# Patient Record
Sex: FEMALE | Race: WHITE | NOT HISPANIC OR LATINO | Employment: OTHER | URBAN - METROPOLITAN AREA
[De-identification: names, ages, dates, MRNs, and addresses within clinical notes are randomized per-mention and may not be internally consistent; named-entity substitution may affect disease eponyms.]

---

## 2018-12-27 ENCOUNTER — APPOINTMENT (OUTPATIENT)
Dept: RADIOLOGY | Facility: MEDICAL CENTER | Age: 48
DRG: 982 | End: 2018-12-27
Attending: EMERGENCY MEDICINE

## 2018-12-27 ENCOUNTER — APPOINTMENT (OUTPATIENT)
Dept: RADIOLOGY | Facility: MEDICAL CENTER | Age: 48
DRG: 982 | End: 2018-12-27
Attending: SURGERY

## 2018-12-27 ENCOUNTER — HOSPITAL ENCOUNTER (INPATIENT)
Facility: MEDICAL CENTER | Age: 48
LOS: 6 days | DRG: 982 | End: 2019-01-02
Attending: EMERGENCY MEDICINE | Admitting: SURGERY

## 2018-12-27 DIAGNOSIS — S36.439A: ICD-10-CM

## 2018-12-27 DIAGNOSIS — G89.18 ACUTE POSTOPERATIVE PAIN: ICD-10-CM

## 2018-12-27 DIAGNOSIS — S22.41XA CLOSED FRACTURE OF FIVE RIBS OF RIGHT SIDE, INITIAL ENCOUNTER: ICD-10-CM

## 2018-12-27 DIAGNOSIS — S12.691A OTHER CLOSED NONDISPLACED FRACTURE OF SEVENTH CERVICAL VERTEBRA, INITIAL ENCOUNTER (HCC): ICD-10-CM

## 2018-12-27 PROBLEM — T14.90XA TRAUMA: Status: ACTIVE | Noted: 2018-12-27

## 2018-12-27 PROBLEM — S01.01XA SCALP LACERATION: Status: ACTIVE | Noted: 2018-12-27

## 2018-12-27 PROBLEM — S22.42XA FRACTURE OF MULTIPLE RIBS OF LEFT SIDE: Status: ACTIVE | Noted: 2018-12-27

## 2018-12-27 PROBLEM — F10.929 ACUTE ALCOHOL INTOXICATION (HCC): Status: ACTIVE | Noted: 2018-12-27

## 2018-12-27 PROBLEM — S12.600A CLOSED FRACTURE OF SEVENTH CERVICAL VERTEBRA (HCC): Status: ACTIVE | Noted: 2018-12-27

## 2018-12-27 PROBLEM — S39.91XA BLUNT ABDOMINAL TRAUMA: Status: ACTIVE | Noted: 2018-12-27

## 2018-12-27 LAB
ABO GROUP BLD: NORMAL
ABO GROUP BLD: NORMAL
ALBUMIN SERPL BCP-MCNC: 4 G/DL (ref 3.2–4.9)
ALBUMIN/GLOB SERPL: 1.3 G/DL
ALP SERPL-CCNC: 70 U/L (ref 30–99)
ALT SERPL-CCNC: 32 U/L (ref 2–50)
ANION GAP SERPL CALC-SCNC: 12 MMOL/L (ref 0–11.9)
APTT PPP: 23.7 SEC (ref 24.7–36)
AST SERPL-CCNC: 47 U/L (ref 12–45)
BASE EXCESS BLDA CALC-SCNC: -9 MMOL/L (ref -4–3)
BILIRUB SERPL-MCNC: 0.6 MG/DL (ref 0.1–1.5)
BLD GP AB SCN SERPL QL: NORMAL
BODY TEMPERATURE: 36.4 CENTIGRADE
BUN SERPL-MCNC: 17 MG/DL (ref 8–22)
CALCIUM SERPL-MCNC: 8.8 MG/DL (ref 8.5–10.5)
CFT BLD TEG: 4.6 MIN (ref 5–10)
CHLORIDE SERPL-SCNC: 109 MMOL/L (ref 96–112)
CLOT ANGLE BLD TEG: 68.3 DEGREES (ref 53–72)
CLOT LYSIS 30M P MA LENFR BLD TEG: 0 % (ref 0–8)
CO2 SERPL-SCNC: 19 MMOL/L (ref 20–33)
CREAT SERPL-MCNC: 0.9 MG/DL (ref 0.5–1.4)
CT.EXTRINSIC BLD ROTEM: 1.5 MIN (ref 1–3)
ERYTHROCYTE [DISTWIDTH] IN BLOOD BY AUTOMATED COUNT: 45.9 FL (ref 35.9–50)
ETHANOL BLD-MCNC: 0.15 G/DL
GLOBULIN SER CALC-MCNC: 3 G/DL (ref 1.9–3.5)
GLUCOSE SERPL-MCNC: 121 MG/DL (ref 65–99)
HCG SERPL QL: NEGATIVE
HCO3 BLDA-SCNC: 13 MMOL/L (ref 17–25)
HCT VFR BLD AUTO: 42.2 % (ref 37–47)
HGB BLD-MCNC: 13.7 G/DL (ref 12–16)
INR PPP: 1.09 (ref 0.87–1.13)
LACTATE BLD-SCNC: 3.2 MMOL/L (ref 0.5–2)
MCF BLD TEG: 58.1 MM (ref 50–70)
MCH RBC QN AUTO: 31.9 PG (ref 27–33)
MCHC RBC AUTO-ENTMCNC: 32.5 G/DL (ref 33.6–35)
MCV RBC AUTO: 98.1 FL (ref 81.4–97.8)
PA AA BLD-ACNC: 68.5 %
PA ADP BLD-ACNC: 90.9 %
PCO2 BLDA: 22.1 MMHG (ref 26–37)
PCO2 TEMP ADJ BLDA: 21.5 MMHG (ref 26–37)
PH BLDA: 7.4 [PH] (ref 7.4–7.5)
PH TEMP ADJ BLDA: 7.41 [PH] (ref 7.4–7.5)
PLATELET # BLD AUTO: 298 K/UL (ref 164–446)
PMV BLD AUTO: 9.6 FL (ref 9–12.9)
PO2 BLDA: 92 MMHG (ref 64–87)
PO2 TEMP ADJ BLDA: 88.6 MMHG (ref 64–87)
POTASSIUM SERPL-SCNC: 3.6 MMOL/L (ref 3.6–5.5)
PROT SERPL-MCNC: 7 G/DL (ref 6–8.2)
PROTHROMBIN TIME: 14.2 SEC (ref 12–14.6)
RBC # BLD AUTO: 4.3 M/UL (ref 4.2–5.4)
RH BLD: NORMAL
RH BLD: NORMAL
SAO2 % BLDA: 96.5 % (ref 93–99)
SODIUM SERPL-SCNC: 140 MMOL/L (ref 135–145)
TEG ALGORITHM TGALG: ABNORMAL
WBC # BLD AUTO: 9.7 K/UL (ref 4.8–10.8)

## 2018-12-27 PROCEDURE — 700111 HCHG RX REV CODE 636 W/ 250 OVERRIDE (IP): Performed by: EMERGENCY MEDICINE

## 2018-12-27 PROCEDURE — 80307 DRUG TEST PRSMV CHEM ANLYZR: CPT

## 2018-12-27 PROCEDURE — 85730 THROMBOPLASTIN TIME PARTIAL: CPT

## 2018-12-27 PROCEDURE — 70486 CT MAXILLOFACIAL W/O DYE: CPT

## 2018-12-27 PROCEDURE — 88307 TISSUE EXAM BY PATHOLOGIST: CPT

## 2018-12-27 PROCEDURE — 80053 COMPREHEN METABOLIC PANEL: CPT

## 2018-12-27 PROCEDURE — 700111 HCHG RX REV CODE 636 W/ 250 OVERRIDE (IP)

## 2018-12-27 PROCEDURE — 72125 CT NECK SPINE W/O DYE: CPT

## 2018-12-27 PROCEDURE — 70450 CT HEAD/BRAIN W/O DYE: CPT

## 2018-12-27 PROCEDURE — 71260 CT THORAX DX C+: CPT

## 2018-12-27 PROCEDURE — 700117 HCHG RX CONTRAST REV CODE 255: Performed by: SURGERY

## 2018-12-27 PROCEDURE — 501838 HCHG SUTURE GENERAL: Performed by: SURGERY

## 2018-12-27 PROCEDURE — 72170 X-RAY EXAM OF PELVIS: CPT

## 2018-12-27 PROCEDURE — 160036 HCHG PACU - EA ADDL 30 MINS PHASE I: Performed by: SURGERY

## 2018-12-27 PROCEDURE — 83605 ASSAY OF LACTIC ACID: CPT

## 2018-12-27 PROCEDURE — 96374 THER/PROPH/DIAG INJ IV PUSH: CPT

## 2018-12-27 PROCEDURE — 502704 HCHG DEVICE, LIGASURE IMPACT: Performed by: SURGERY

## 2018-12-27 PROCEDURE — 501445 HCHG STAPLER, SKIN DISP: Performed by: SURGERY

## 2018-12-27 PROCEDURE — 160041 HCHG SURGERY MINUTES - EA ADDL 1 MIN LEVEL 4: Performed by: SURGERY

## 2018-12-27 PROCEDURE — 84484 ASSAY OF TROPONIN QUANT: CPT

## 2018-12-27 PROCEDURE — 770022 HCHG ROOM/CARE - ICU (200)

## 2018-12-27 PROCEDURE — 90715 TDAP VACCINE 7 YRS/> IM: CPT | Performed by: EMERGENCY MEDICINE

## 2018-12-27 PROCEDURE — 501452 HCHG STAPLES, GIA MULTIFIRE 60/80: Performed by: SURGERY

## 2018-12-27 PROCEDURE — 86850 RBC ANTIBODY SCREEN: CPT

## 2018-12-27 PROCEDURE — 86900 BLOOD TYPING SEROLOGIC ABO: CPT

## 2018-12-27 PROCEDURE — 99291 CRITICAL CARE FIRST HOUR: CPT

## 2018-12-27 PROCEDURE — 3E0234Z INTRODUCTION OF SERUM, TOXOID AND VACCINE INTO MUSCLE, PERCUTANEOUS APPROACH: ICD-10-PCS | Performed by: SURGERY

## 2018-12-27 PROCEDURE — 160009 HCHG ANES TIME/MIN: Performed by: SURGERY

## 2018-12-27 PROCEDURE — G0390 TRAUMA RESPONS W/HOSP CRITI: HCPCS

## 2018-12-27 PROCEDURE — 71045 X-RAY EXAM CHEST 1 VIEW: CPT

## 2018-12-27 PROCEDURE — 85347 COAGULATION TIME ACTIVATED: CPT

## 2018-12-27 PROCEDURE — 700101 HCHG RX REV CODE 250

## 2018-12-27 PROCEDURE — 160048 HCHG OR STATISTICAL LEVEL 1-5: Performed by: SURGERY

## 2018-12-27 PROCEDURE — 85610 PROTHROMBIN TIME: CPT

## 2018-12-27 PROCEDURE — 160002 HCHG RECOVERY MINUTES (STAT): Performed by: SURGERY

## 2018-12-27 PROCEDURE — 86901 BLOOD TYPING SEROLOGIC RH(D): CPT

## 2018-12-27 PROCEDURE — 84703 CHORIONIC GONADOTROPIN ASSAY: CPT

## 2018-12-27 PROCEDURE — 72131 CT LUMBAR SPINE W/O DYE: CPT

## 2018-12-27 PROCEDURE — 72128 CT CHEST SPINE W/O DYE: CPT

## 2018-12-27 PROCEDURE — 160035 HCHG PACU - 1ST 60 MINS PHASE I: Performed by: SURGERY

## 2018-12-27 PROCEDURE — 160029 HCHG SURGERY MINUTES - 1ST 30 MINS LEVEL 4: Performed by: SURGERY

## 2018-12-27 PROCEDURE — 85384 FIBRINOGEN ACTIVITY: CPT

## 2018-12-27 PROCEDURE — 82803 BLOOD GASES ANY COMBINATION: CPT

## 2018-12-27 PROCEDURE — 85576 BLOOD PLATELET AGGREGATION: CPT

## 2018-12-27 PROCEDURE — 160022 HCHG BLOCK: Performed by: SURGERY

## 2018-12-27 PROCEDURE — 501435 HCHG STAPLER, LINEAR 60: Performed by: SURGERY

## 2018-12-27 PROCEDURE — 85027 COMPLETE CBC AUTOMATED: CPT

## 2018-12-27 RX ADMIN — FENTANYL CITRATE 50 MCG: 50 INJECTION, SOLUTION INTRAMUSCULAR; INTRAVENOUS at 22:35

## 2018-12-27 RX ADMIN — CLOSTRIDIUM TETANI TOXOID ANTIGEN (FORMALDEHYDE INACTIVATED), CORYNEBACTERIUM DIPHTHERIAE TOXOID ANTIGEN (FORMALDEHYDE INACTIVATED), BORDETELLA PERTUSSIS TOXOID ANTIGEN (GLUTARALDEHYDE INACTIVATED), BORDETELLA PERTUSSIS FILAMENTOUS HEMAGGLUTININ ANTIGEN (FORMALDEHYDE INACTIVATED), BORDETELLA PERTUSSIS PERTACTIN ANTIGEN, AND BORDETELLA PERTUSSIS FIMBRIAE 2/3 ANTIGEN 0.5 ML: 5; 2; 2.5; 5; 3; 5 INJECTION, SUSPENSION INTRAMUSCULAR at 23:01

## 2018-12-27 RX ADMIN — IOHEXOL 100 ML: 350 INJECTION, SOLUTION INTRAVENOUS at 23:06

## 2018-12-28 ENCOUNTER — APPOINTMENT (OUTPATIENT)
Dept: RADIOLOGY | Facility: MEDICAL CENTER | Age: 48
DRG: 982 | End: 2018-12-28
Attending: EMERGENCY MEDICINE

## 2018-12-28 ENCOUNTER — APPOINTMENT (OUTPATIENT)
Dept: RADIOLOGY | Facility: MEDICAL CENTER | Age: 48
DRG: 982 | End: 2018-12-28
Attending: NEUROLOGICAL SURGERY

## 2018-12-28 ENCOUNTER — APPOINTMENT (OUTPATIENT)
Dept: RADIOLOGY | Facility: MEDICAL CENTER | Age: 48
DRG: 982 | End: 2018-12-28
Attending: SURGERY

## 2018-12-28 ENCOUNTER — APPOINTMENT (OUTPATIENT)
Dept: CARDIOLOGY | Facility: MEDICAL CENTER | Age: 48
DRG: 982 | End: 2018-12-28
Attending: ANESTHESIOLOGY
Payer: OTHER MISCELLANEOUS

## 2018-12-28 PROBLEM — I49.3 VENTRICULAR ECTOPY: Status: ACTIVE | Noted: 2018-12-28

## 2018-12-28 PROBLEM — J98.2 PNEUMOMEDIASTINUM (HCC): Status: ACTIVE | Noted: 2018-12-28

## 2018-12-28 PROBLEM — S36.439A SMALL BOWEL LACERATION: Status: ACTIVE | Noted: 2018-12-27

## 2018-12-28 PROBLEM — S22.32XA CLOSED FRACTURE OF ONE RIB OF LEFT SIDE: Status: ACTIVE | Noted: 2018-12-28

## 2018-12-28 PROBLEM — S27.322A BILATERAL PULMONARY CONTUSION: Status: ACTIVE | Noted: 2018-12-28

## 2018-12-28 PROBLEM — Z78.9 NO CONTRAINDICATION TO DEEP VEIN THROMBOSIS (DVT) PROPHYLAXIS: Status: ACTIVE | Noted: 2018-12-28

## 2018-12-28 PROBLEM — S22.41XA: Status: ACTIVE | Noted: 2018-12-27

## 2018-12-28 PROBLEM — Z53.09 CONTRAINDICATION TO DEEP VEIN THROMBOSIS (DVT) PROPHYLAXIS: Status: ACTIVE | Noted: 2018-12-28

## 2018-12-28 LAB
ANION GAP SERPL CALC-SCNC: 10 MMOL/L (ref 0–11.9)
BASOPHILS # BLD AUTO: 0.2 % (ref 0–1.8)
BASOPHILS # BLD: 0.02 K/UL (ref 0–0.12)
BUN SERPL-MCNC: 14 MG/DL (ref 8–22)
CALCIUM SERPL-MCNC: 8.2 MG/DL (ref 8.5–10.5)
CHLORIDE SERPL-SCNC: 109 MMOL/L (ref 96–112)
CO2 SERPL-SCNC: 21 MMOL/L (ref 20–33)
CREAT SERPL-MCNC: 0.54 MG/DL (ref 0.5–1.4)
EOSINOPHIL # BLD AUTO: 0 K/UL (ref 0–0.51)
EOSINOPHIL NFR BLD: 0 % (ref 0–6.9)
ERYTHROCYTE [DISTWIDTH] IN BLOOD BY AUTOMATED COUNT: 44.5 FL (ref 35.9–50)
GLUCOSE SERPL-MCNC: 114 MG/DL (ref 65–99)
HCT VFR BLD AUTO: 37 % (ref 37–47)
HGB BLD-MCNC: 12.5 G/DL (ref 12–16)
IMM GRANULOCYTES # BLD AUTO: 0.03 K/UL (ref 0–0.11)
IMM GRANULOCYTES NFR BLD AUTO: 0.3 % (ref 0–0.9)
LACTATE BLD-SCNC: 1 MMOL/L (ref 0.5–2)
LYMPHOCYTES # BLD AUTO: 0.44 K/UL (ref 1–4.8)
LYMPHOCYTES NFR BLD: 4.5 % (ref 22–41)
MCH RBC QN AUTO: 32.2 PG (ref 27–33)
MCHC RBC AUTO-ENTMCNC: 33.8 G/DL (ref 33.6–35)
MCV RBC AUTO: 95.4 FL (ref 81.4–97.8)
MONOCYTES # BLD AUTO: 0.36 K/UL (ref 0–0.85)
MONOCYTES NFR BLD AUTO: 3.7 % (ref 0–13.4)
NEUTROPHILS # BLD AUTO: 8.92 K/UL (ref 2–7.15)
NEUTROPHILS NFR BLD: 91.3 % (ref 44–72)
NRBC # BLD AUTO: 0 K/UL
NRBC BLD-RTO: 0 /100 WBC
PATHOLOGY CONSULT NOTE: NORMAL
PLATELET # BLD AUTO: 220 K/UL (ref 164–446)
PMV BLD AUTO: 9.6 FL (ref 9–12.9)
POTASSIUM SERPL-SCNC: 3.6 MMOL/L (ref 3.6–5.5)
RBC # BLD AUTO: 3.88 M/UL (ref 4.2–5.4)
SODIUM SERPL-SCNC: 140 MMOL/L (ref 135–145)
TROPONIN I SERPL-MCNC: <0.01 NG/ML (ref 0–0.04)
TROPONIN I SERPL-MCNC: <0.01 NG/ML (ref 0–0.04)
WBC # BLD AUTO: 9.8 K/UL (ref 4.8–10.8)

## 2018-12-28 PROCEDURE — 700111 HCHG RX REV CODE 636 W/ 250 OVERRIDE (IP): Performed by: SURGERY

## 2018-12-28 PROCEDURE — 99233 SBSQ HOSP IP/OBS HIGH 50: CPT | Performed by: SURGERY

## 2018-12-28 PROCEDURE — 700111 HCHG RX REV CODE 636 W/ 250 OVERRIDE (IP): Performed by: ANESTHESIOLOGY

## 2018-12-28 PROCEDURE — 93325 DOPPLER ECHO COLOR FLOW MAPG: CPT

## 2018-12-28 PROCEDURE — 84484 ASSAY OF TROPONIN QUANT: CPT

## 2018-12-28 PROCEDURE — 700111 HCHG RX REV CODE 636 W/ 250 OVERRIDE (IP)

## 2018-12-28 PROCEDURE — 71045 X-RAY EXAM CHEST 1 VIEW: CPT

## 2018-12-28 PROCEDURE — 83605 ASSAY OF LACTIC ACID: CPT

## 2018-12-28 PROCEDURE — 85025 COMPLETE CBC W/AUTO DIFF WBC: CPT

## 2018-12-28 PROCEDURE — 76705 ECHO EXAM OF ABDOMEN: CPT

## 2018-12-28 PROCEDURE — 700111 HCHG RX REV CODE 636 W/ 250 OVERRIDE (IP): Performed by: NURSE PRACTITIONER

## 2018-12-28 PROCEDURE — 700105 HCHG RX REV CODE 258: Performed by: SURGERY

## 2018-12-28 PROCEDURE — A9270 NON-COVERED ITEM OR SERVICE: HCPCS

## 2018-12-28 PROCEDURE — 0DQL0ZZ REPAIR TRANSVERSE COLON, OPEN APPROACH: ICD-10-PCS | Performed by: SURGERY

## 2018-12-28 PROCEDURE — 770006 HCHG ROOM/CARE - MED/SURG/GYN SEMI*

## 2018-12-28 PROCEDURE — 80048 BASIC METABOLIC PNL TOTAL CA: CPT

## 2018-12-28 PROCEDURE — 0JQ10ZZ REPAIR FACE SUBCUTANEOUS TISSUE AND FASCIA, OPEN APPROACH: ICD-10-PCS | Performed by: PLASTIC SURGERY

## 2018-12-28 PROCEDURE — 0DBA0ZZ EXCISION OF JEJUNUM, OPEN APPROACH: ICD-10-PCS | Performed by: SURGERY

## 2018-12-28 PROCEDURE — 700102 HCHG RX REV CODE 250 W/ 637 OVERRIDE(OP)

## 2018-12-28 RX ORDER — SODIUM CHLORIDE, SODIUM LACTATE, POTASSIUM CHLORIDE, CALCIUM CHLORIDE 600; 310; 30; 20 MG/100ML; MG/100ML; MG/100ML; MG/100ML
INJECTION, SOLUTION INTRAVENOUS CONTINUOUS
Status: DISCONTINUED | OUTPATIENT
Start: 2018-12-28 | End: 2018-12-28 | Stop reason: HOSPADM

## 2018-12-28 RX ORDER — ONDANSETRON 2 MG/ML
4 INJECTION INTRAMUSCULAR; INTRAVENOUS EVERY 4 HOURS PRN
Status: DISCONTINUED | OUTPATIENT
Start: 2018-12-28 | End: 2019-01-02 | Stop reason: HOSPADM

## 2018-12-28 RX ORDER — MEPERIDINE HYDROCHLORIDE 25 MG/ML
12.5 INJECTION INTRAMUSCULAR; INTRAVENOUS; SUBCUTANEOUS
Status: DISCONTINUED | OUTPATIENT
Start: 2018-12-28 | End: 2018-12-28 | Stop reason: HOSPADM

## 2018-12-28 RX ORDER — BACITRACIN ZINC 500 [USP'U]/G
OINTMENT TOPICAL
Status: DISCONTINUED | OUTPATIENT
Start: 2018-12-28 | End: 2018-12-28 | Stop reason: HOSPADM

## 2018-12-28 RX ORDER — HYDROMORPHONE HYDROCHLORIDE 1 MG/ML
0.2 INJECTION, SOLUTION INTRAMUSCULAR; INTRAVENOUS; SUBCUTANEOUS
Status: DISCONTINUED | OUTPATIENT
Start: 2018-12-28 | End: 2018-12-28 | Stop reason: HOSPADM

## 2018-12-28 RX ORDER — SODIUM CHLORIDE, SODIUM LACTATE, POTASSIUM CHLORIDE, CALCIUM CHLORIDE 600; 310; 30; 20 MG/100ML; MG/100ML; MG/100ML; MG/100ML
INJECTION, SOLUTION INTRAVENOUS CONTINUOUS
Status: DISCONTINUED | OUTPATIENT
Start: 2018-12-28 | End: 2018-12-30

## 2018-12-28 RX ORDER — LIDOCAINE HYDROCHLORIDE AND EPINEPHRINE BITARTRATE 20; .01 MG/ML; MG/ML
INJECTION, SOLUTION SUBCUTANEOUS
Status: DISCONTINUED | OUTPATIENT
Start: 2018-12-28 | End: 2018-12-28 | Stop reason: HOSPADM

## 2018-12-28 RX ORDER — HALOPERIDOL 5 MG/ML
1 INJECTION INTRAMUSCULAR
Status: DISCONTINUED | OUTPATIENT
Start: 2018-12-28 | End: 2018-12-28 | Stop reason: HOSPADM

## 2018-12-28 RX ORDER — HYDROMORPHONE HYDROCHLORIDE 2 MG/ML
.5-1 INJECTION, SOLUTION INTRAMUSCULAR; INTRAVENOUS; SUBCUTANEOUS
Status: DISCONTINUED | OUTPATIENT
Start: 2018-12-28 | End: 2018-12-28

## 2018-12-28 RX ORDER — DIPHENHYDRAMINE HYDROCHLORIDE 50 MG/ML
12.5 INJECTION INTRAMUSCULAR; INTRAVENOUS
Status: DISCONTINUED | OUTPATIENT
Start: 2018-12-28 | End: 2018-12-28 | Stop reason: HOSPADM

## 2018-12-28 RX ORDER — HYDROMORPHONE HYDROCHLORIDE 1 MG/ML
0.4 INJECTION, SOLUTION INTRAMUSCULAR; INTRAVENOUS; SUBCUTANEOUS
Status: DISCONTINUED | OUTPATIENT
Start: 2018-12-28 | End: 2018-12-28 | Stop reason: HOSPADM

## 2018-12-28 RX ORDER — MAGNESIUM SULFATE HEPTAHYDRATE 40 MG/ML
2 INJECTION, SOLUTION INTRAVENOUS ONCE
Status: COMPLETED | OUTPATIENT
Start: 2018-12-28 | End: 2018-12-28

## 2018-12-28 RX ORDER — HYDROMORPHONE HYDROCHLORIDE 1 MG/ML
0.1 INJECTION, SOLUTION INTRAMUSCULAR; INTRAVENOUS; SUBCUTANEOUS
Status: DISCONTINUED | OUTPATIENT
Start: 2018-12-28 | End: 2018-12-28 | Stop reason: HOSPADM

## 2018-12-28 RX ORDER — HYDROMORPHONE HYDROCHLORIDE 1 MG/ML
.5-1 INJECTION, SOLUTION INTRAMUSCULAR; INTRAVENOUS; SUBCUTANEOUS
Status: DISCONTINUED | OUTPATIENT
Start: 2018-12-28 | End: 2018-12-28

## 2018-12-28 RX ORDER — ONDANSETRON 2 MG/ML
4 INJECTION INTRAMUSCULAR; INTRAVENOUS
Status: DISCONTINUED | OUTPATIENT
Start: 2018-12-28 | End: 2018-12-28 | Stop reason: HOSPADM

## 2018-12-28 RX ADMIN — SODIUM CHLORIDE, POTASSIUM CHLORIDE, SODIUM LACTATE AND CALCIUM CHLORIDE: 600; 310; 30; 20 INJECTION, SOLUTION INTRAVENOUS at 01:15

## 2018-12-28 RX ADMIN — HYDROMORPHONE HYDROCHLORIDE 0.4 MG: 1 INJECTION, SOLUTION INTRAMUSCULAR; INTRAVENOUS; SUBCUTANEOUS at 02:54

## 2018-12-28 RX ADMIN — SODIUM CHLORIDE, POTASSIUM CHLORIDE, SODIUM LACTATE AND CALCIUM CHLORIDE: 600; 310; 30; 20 INJECTION, SOLUTION INTRAVENOUS at 10:03

## 2018-12-28 RX ADMIN — HYDROMORPHONE HYDROCHLORIDE 1 MG: 2 INJECTION INTRAMUSCULAR; INTRAVENOUS; SUBCUTANEOUS at 04:23

## 2018-12-28 RX ADMIN — Medication: at 21:38

## 2018-12-28 RX ADMIN — Medication: at 10:08

## 2018-12-28 RX ADMIN — FENTANYL CITRATE 50 MCG: 50 INJECTION, SOLUTION INTRAMUSCULAR; INTRAVENOUS at 02:12

## 2018-12-28 RX ADMIN — FAMOTIDINE 20 MG: 10 INJECTION INTRAVENOUS at 10:17

## 2018-12-28 RX ADMIN — MAGNESIUM SULFATE 2 G: 2 INJECTION INTRAVENOUS at 01:47

## 2018-12-28 RX ADMIN — ENOXAPARIN SODIUM 30 MG: 100 INJECTION SUBCUTANEOUS at 16:53

## 2018-12-28 RX ADMIN — FAMOTIDINE 20 MG: 10 INJECTION INTRAVENOUS at 16:52

## 2018-12-28 RX ADMIN — HYDROMORPHONE HYDROCHLORIDE 0.4 MG: 1 INJECTION, SOLUTION INTRAMUSCULAR; INTRAVENOUS; SUBCUTANEOUS at 02:47

## 2018-12-28 RX ADMIN — SODIUM CHLORIDE, POTASSIUM CHLORIDE, SODIUM LACTATE AND CALCIUM CHLORIDE: 600; 310; 30; 20 INJECTION, SOLUTION INTRAVENOUS at 17:47

## 2018-12-28 RX ADMIN — HYDROMORPHONE HYDROCHLORIDE 0.2 MG: 1 INJECTION, SOLUTION INTRAMUSCULAR; INTRAVENOUS; SUBCUTANEOUS at 03:02

## 2018-12-28 RX ADMIN — FENTANYL CITRATE 50 MCG: 50 INJECTION, SOLUTION INTRAMUSCULAR; INTRAVENOUS at 01:57

## 2018-12-28 RX ADMIN — HYDROMORPHONE HYDROCHLORIDE 1 MG: 2 INJECTION INTRAMUSCULAR; INTRAVENOUS; SUBCUTANEOUS at 07:36

## 2018-12-28 ASSESSMENT — PATIENT HEALTH QUESTIONNAIRE - PHQ9
2. FEELING DOWN, DEPRESSED, IRRITABLE, OR HOPELESS: NOT AT ALL
SUM OF ALL RESPONSES TO PHQ9 QUESTIONS 1 AND 2: 0
1. LITTLE INTEREST OR PLEASURE IN DOING THINGS: NOT AT ALL

## 2018-12-28 ASSESSMENT — PAIN SCALES - GENERAL
PAINLEVEL_OUTOF10: 0
PAINLEVEL_OUTOF10: 8
PAINLEVEL_OUTOF10: 8
PAINLEVEL_OUTOF10: 0
PAINLEVEL_OUTOF10: 7
PAINLEVEL_OUTOF10: 8
PAINLEVEL_OUTOF10: 6
PAINLEVEL_OUTOF10: 6
PAINLEVEL_OUTOF10: 4
PAINLEVEL_OUTOF10: 6
PAINLEVEL_OUTOF10: 7
PAINLEVEL_OUTOF10: 7

## 2018-12-28 ASSESSMENT — LIFESTYLE VARIABLES
EVER HAD A DRINK FIRST THING IN THE MORNING TO STEADY YOUR NERVES TO GET RID OF A HANGOVER: NO
CONSUMPTION TOTAL: POSITIVE
HAVE YOU EVER FELT YOU SHOULD CUT DOWN ON YOUR DRINKING: NO
EVER_SMOKED: NEVER
TOTAL SCORE: 0
ALCOHOL_USE: YES
EVER FELT BAD OR GUILTY ABOUT YOUR DRINKING: NO
EVER_SMOKED: YES
SUBSTANCE_ABUSE: 1
HAVE PEOPLE ANNOYED YOU BY CRITICIZING YOUR DRINKING: NO
TOTAL SCORE: 0
ON A TYPICAL DAY WHEN YOU DRINK ALCOHOL HOW MANY DRINKS DO YOU HAVE: 1
HOW MANY TIMES IN THE PAST YEAR HAVE YOU HAD 5 OR MORE DRINKS IN A DAY: 2
AVERAGE NUMBER OF DAYS PER WEEK YOU HAVE A DRINK CONTAINING ALCOHOL: 5
TOTAL SCORE: 0

## 2018-12-28 ASSESSMENT — COPD QUESTIONNAIRES
HAVE YOU SMOKED AT LEAST 100 CIGARETTES IN YOUR ENTIRE LIFE: NO/DON'T KNOW
DURING THE PAST 4 WEEKS HOW MUCH DID YOU FEEL SHORT OF BREATH: NONE/LITTLE OF THE TIME
COPD SCREENING SCORE: 0
DO YOU EVER COUGH UP ANY MUCUS OR PHLEGM?: NO/ONLY WITH OCCASIONAL COLDS OR INFECTIONS

## 2018-12-28 ASSESSMENT — COGNITIVE AND FUNCTIONAL STATUS - GENERAL
DRESSING REGULAR LOWER BODY CLOTHING: A LOT
MOBILITY SCORE: 17
HELP NEEDED FOR BATHING: A LOT
STANDING UP FROM CHAIR USING ARMS: A LOT
TURNING FROM BACK TO SIDE WHILE IN FLAT BAD: A LITTLE
MOVING TO AND FROM BED TO CHAIR: A LITTLE
CLIMB 3 TO 5 STEPS WITH RAILING: A LITTLE
MOVING FROM LYING ON BACK TO SITTING ON SIDE OF FLAT BED: A LITTLE
DAILY ACTIVITIY SCORE: 17
WALKING IN HOSPITAL ROOM: A LITTLE
PERSONAL GROOMING: A LITTLE
DRESSING REGULAR UPPER BODY CLOTHING: A LITTLE
SUGGESTED CMS G CODE MODIFIER MOBILITY: CK
SUGGESTED CMS G CODE MODIFIER DAILY ACTIVITY: CK
TOILETING: A LITTLE

## 2018-12-28 ASSESSMENT — ENCOUNTER SYMPTOMS
CHILLS: 0
BLURRED VISION: 0
ROS GI COMMENTS: BM PRIOR TO ARRIVAL
FOCAL WEAKNESS: 0
TREMORS: 0
SHORTNESS OF BREATH: 0
DOUBLE VISION: 0
HEADACHES: 0
NAUSEA: 0
FEVER: 0
TINGLING: 0
NECK PAIN: 1
VOMITING: 0
DIZZINESS: 0
BACK PAIN: 0
SPEECH CHANGE: 0
ABDOMINAL PAIN: 1
MYALGIAS: 1
SENSORY CHANGE: 0

## 2018-12-28 NOTE — OR NURSING
Pt having frequent PVCs, called Dr. Tomlinson and he concurred pt was having this in OR and he also said Dr. Juarez is aware.  He provided order for magnesium.

## 2018-12-28 NOTE — ED NOTES
Pt. Upgraded to a trauma Red per Dr. Cooper. Pulses decreased. Unable to obtain manual BP and/or Monitored BP.
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
## 2018-12-28 NOTE — PROGRESS NOTES
0315: Pt to S126 accompanied by PACU RN Deangelo. VSS:   HR: 85  BP: 121/73  SpO2: 100% (3.5L oxymask)  RR: 12-14  T: 97.5F (temporal)  Wt: 63.5 kg (bed scale)  Pain: Midline abdomen (5-6/10, sore)    SCD's applied. 2-RN skin check performed. Noted as follows:   -Repaired laceration (sutured) to forehead, nondraining.  -Abrasion to L clavicle.  -Midline abdominal surgical incision (dressing CDI).  -Small abrasion to R thumb and R middle finger (knuckle).  -Bruising to L flank, R upper thigh/knee (pt reports from a prior fall). L knee bruising (from prior fall as well per pt).

## 2018-12-28 NOTE — PROGRESS NOTES
Dictation line down  Full consult to follow once system is up  Complex forehead laceration  Dr. Juarez is taking the pt to the OR for an X lap  I will repair forehead lacs while the patient is asleep, no fractures on maxillofacial CT scan

## 2018-12-28 NOTE — OP REPORT
DATE OF SERVICE:  12/28/2018    PREOPERATIVE DIAGNOSIS:  Complex laceration of forehead.    POSTOPERATIVE DIAGNOSIS:  Complex laceration of forehead.    PROCEDURE PERFORMED:  Repair of a 12 cm complex laceration of the forehead on   to the glabellar area of the nose and back up onto the forehead.    SURGEON:  Kana Swenson MD    ASSISTANT:  None.    ANESTHESIOLOGIST:  Eliezer Tomlinson MD    OPERATIVE INDICATIONS:  Dr. Juarez called me.  He had a young female who he   was emergently taken to the operating room for laparotomy.  She had a complex   laceration of the forehead, they asked me to repair.  When I came to evaluate   the patient, she was intubated and was undergoing her exploratory laparotomy.    On examination, the patient had a complex irregularly shaped bevelled   laceration, which extended from the forehead on one side across the glabellar   area down on to root of the nose and back up to the forehead on the other   side.  There was some nonviable tissue in the wound that needs to be debrided   as well.    OPERATIVE DESCRIPTION:  The patient's nonviable tissue was sharply excised   using scissors.  Once we had clean edges, bleeding was treated with direct   pressure.  The frontalis muscle was reapproximated using interrupted 3-0   Vicryl suture.  Excess subcutaneous tissue was approximated with 5-0 Vicryl   suture.  The skin was approximated with a running 6-0 nylon suture.  Anatomic   approximation was determined to be good.  Antibiotic ointment was applied.    Patient was left in the operative room under the care of Dr. Juarez who was   continuing his exploratory laparotomy.       ____________________________________     KANA SWENSON MD    SWW / NTS    DD:  12/28/2018 00:24:08  DT:  12/28/2018 00:34:53    D#:  [id number]  Job#:  [id number]

## 2018-12-28 NOTE — OP REPORT
DATE OF SERVICE:  12/28/2018    PREOPERATIVE DIAGNOSIS:  Intra-abdominal free air.    POSTOPERATIVE DIAGNOSIS:  Intra-abdominal free air.    PROCEDURES PERFORMED:  1.  Exploratory laparotomy, suture repair of right colon injury.  2.  Small bowel resection with anastomosis.    PRIMARY SURGEON:  Ramesh Juarez MD    ASSISTANT:  Aydee Diggs NP    ANESTHESIA:  General endotracheal.    ESTIMATED BLOOD LOSS:  25 mL.    COMPLICATIONS:  None immediately apparent.    SPECIMENS:  Jejunum.    OPERATIVE FINDINGS:  Along the antimesenteric edge of the proximal transverse   colon, there was a 3.5 cm serosal tear near one of the taenia.  Approximately 20   cm distal to the ligament of Treitz, there is a jejunal injury involving   approximately 40% of the circumference of the bowel in this area and another   25 cm distal to that injury, there was an additional jejunal injury involving   approximately 25% of the circumference of the intestine.  The 2 injuries were   excised as a single specimen.  There was minimal, if any, contamination from the bowel perforations.    DESCRIPTION OF PROCEDURE:  The patient was taken back to operating room and   placed in the supine position.  General anesthesia was induced and the patient   was intubated.  Bilateral SCD devices were placed.  Appropriate IV   antibiotics were given.  All bony prominences were carefully protected.  The   abdomen was then widely prepped and draped in a sterile fashion.  A time-out   was performed.  The patient and procedure both verified.    A long vertical midline incision was made.  Subcutaneous tissues were divided   down to level of the fascia.  The fascia was then opened along the entire   length of the incision.  A Bookwalter was affixed to the table and used for   retraction.  Upon initial survey of the abdomen, there was blood throughout   the entire abdominal compartment, maybe 20 mL or so.  The omentum had some   blunt injury to it.  Hemostasis of it  was achieved with electrocautery where   needed.  I then evaluated the colon and the above injury was identified.  This   was repaired with interrupted imbricating 3-0 Vicryl sutures.  I then   identified the ligament of Treitz and identified the 2 injuries.  I resected   the above-mentioned segment using serial firings from a DAKOTA-75 mm stapler with   blue loads.  This was handed off as specimen.  The mesentery of this   intestine was divided using a LigaSure impact device.  I then performed a   side-to-side stapled anastomosis with a single firing from a DAKOTA-75 mm stapler   with a blue load.  The resultant defect then created was closed with a single   firing from a TA 60 stapler with a blue load.  The anastomosis was palpated   and was noted to be widely patent.  The staple lines appeared to be healthy   and hemostatic.  I then ran the bowel from the ligament of Treitz down to the   terminal ileum.  There was no further injury identified.  I then ran the   intraabdominal portions of the colon.  I did identify small mesenteric   hematoma associated with the mid portion of the mesentery of the transverse   colon.  However, the colon in this area appeared to be viable.  The remaining   portions of the colon appeared to be free from injury as well.  The abdomen   was then copiously irrigated with 2 liters of warm saline.  The anastomosis   was placed into a relaxed position.  The omentum nearby was used to cover.    The midline fascia was then reapproximated with two #1 nonlooped PDS.  Once   tied down, the fascia was noted to be tight and well approximated.  The wound   was then approximated using interrupted 3-0 Vicryl deep dermal sutures.  Skin   was closed with staples.    Overall, the patient tolerated this procedure well.  She was extubated in the   OR and taken to the PACU in stable condition.  All sharps and sponge counts   were correct by end of the case on 2 occasions.    ASA SCORE:  2E.    WOUND  CLASSIFICATION:  Contaminated.       ____________________________________     MD CRISELDA Aleman    DD:  12/28/2018 08:35:55  DT:  12/28/2018 08:50:46    D#:  [id number]  Job#:  [id number]
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
## 2018-12-28 NOTE — OR NURSING
Traction orderlies replaced cervical collar with Constable J collar.  Pt still oozing from right eyebrow laceration site.  This site cleaned with gauze and normal saline with dry gauze to lay over site.  Pt is communicating verbally with me and is appropriate in her responses.  She does have nasogastric tube in left nares and this to low suction.

## 2018-12-28 NOTE — CARE PLAN
Problem: Pain Management  Goal: Pain level will decrease to patient's comfort goal    Intervention: Follow pain managment plan developed in collaboration with patient and Interdisciplinary Team  Pain assessed Q2. Pharmacological and nonpharmacological methods used to control pain.       Problem: Skin Integrity  Goal: Risk for impaired skin integrity will decrease    Intervention: Assess risk factors for impaired skin integrity and/or pressure ulcers  Redness noted at sacrum, blanching. Mepilex placed.
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
## 2018-12-28 NOTE — CARE PLAN
Problem: Infection  Goal: Will remain free from infection  Outcome: PROGRESSING AS EXPECTED    Intervention: Implement standard precautions and perform hand washing before and after patient contact  Hand hygiene performed before and after assessing patient. Daily CHG bath completed. Scrub the hub prior to accessing IVs. Linens changed daily and PRN when soiled.       Problem: Pain Management  Goal: Pain level will decrease to patient's comfort goal  Outcome: PROGRESSING AS EXPECTED   12/28/18 0115 12/28/18 0800   OTHER   Pain Scale 0 - 10  --  7   Non Verbal Scale  Calm --    Comfort Goal --  Comfort at Rest     Patient medicated for pain per MAR. Non pharmacologic pain interventions in place to decrease pain; pillows used for support and repositioning. Blankets offered. Temperature adjusted per pt request.
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
## 2018-12-28 NOTE — OR SURGEON
Immediate Post OP Note    PreOp Diagnosis: 12 cm laceration of the forehead    PostOp Diagnosis: same    Procedure(s):  EXPLORATORY LAPAROTOMY - Wound Class: Clean Contaminated  BOWEL RESECTION - Wound Class: Clean Contaminated  LACERATION REPAIR - Wound Class: Contaminated    Surgeon(s):  SAM Goel M.D.    Anesthesiologist/Type of Anesthesia:  Anesthesiologist: Eliezer Tomlinson M.D.  Anesthesia Technician: Nas Weaver/General    Surgical Staff:  Circulator: Zofia Epstein RAMADOR; Farzana Conteh RAMADOR  Scrub Person: Bryan Fields    Specimens removed if any:  * No specimens in log *    Estimated Blood Loss: 5 ml    Findings: complex laceration of the forehead    Complications: none        12/28/2018 12:22 AM Kana Swenson M.D.

## 2018-12-28 NOTE — PROGRESS NOTES
POD 1  No events  Lac looks well approximated and is without signs of complication  No changes  I will check back on Monday

## 2018-12-29 ENCOUNTER — APPOINTMENT (OUTPATIENT)
Dept: RADIOLOGY | Facility: MEDICAL CENTER | Age: 48
DRG: 982 | End: 2018-12-29
Attending: NURSE PRACTITIONER

## 2018-12-29 ENCOUNTER — APPOINTMENT (OUTPATIENT)
Dept: RADIOLOGY | Facility: MEDICAL CENTER | Age: 48
DRG: 982 | End: 2018-12-29
Attending: SURGERY

## 2018-12-29 LAB
BASOPHILS # BLD AUTO: 0.2 % (ref 0–1.8)
BASOPHILS # BLD: 0.02 K/UL (ref 0–0.12)
EOSINOPHIL # BLD AUTO: 0 K/UL (ref 0–0.51)
EOSINOPHIL NFR BLD: 0 % (ref 0–6.9)
ERYTHROCYTE [DISTWIDTH] IN BLOOD BY AUTOMATED COUNT: 45.6 FL (ref 35.9–50)
HCT VFR BLD AUTO: 33.4 % (ref 37–47)
HGB BLD-MCNC: 11 G/DL (ref 12–16)
IMM GRANULOCYTES # BLD AUTO: 0.04 K/UL (ref 0–0.11)
IMM GRANULOCYTES NFR BLD AUTO: 0.4 % (ref 0–0.9)
LYMPHOCYTES # BLD AUTO: 1.16 K/UL (ref 1–4.8)
LYMPHOCYTES NFR BLD: 12.1 % (ref 22–41)
MCH RBC QN AUTO: 32.5 PG (ref 27–33)
MCHC RBC AUTO-ENTMCNC: 32.9 G/DL (ref 33.6–35)
MCV RBC AUTO: 98.8 FL (ref 81.4–97.8)
MONOCYTES # BLD AUTO: 0.23 K/UL (ref 0–0.85)
MONOCYTES NFR BLD AUTO: 2.4 % (ref 0–13.4)
NEUTROPHILS # BLD AUTO: 8.15 K/UL (ref 2–7.15)
NEUTROPHILS NFR BLD: 84.9 % (ref 44–72)
NRBC # BLD AUTO: 0 K/UL
NRBC BLD-RTO: 0 /100 WBC
PLATELET # BLD AUTO: 176 K/UL (ref 164–446)
PMV BLD AUTO: 9.7 FL (ref 9–12.9)
RBC # BLD AUTO: 3.38 M/UL (ref 4.2–5.4)
WBC # BLD AUTO: 9.6 K/UL (ref 4.8–10.8)

## 2018-12-29 PROCEDURE — 700101 HCHG RX REV CODE 250: Performed by: NURSE PRACTITIONER

## 2018-12-29 PROCEDURE — 700105 HCHG RX REV CODE 258: Performed by: SURGERY

## 2018-12-29 PROCEDURE — 700111 HCHG RX REV CODE 636 W/ 250 OVERRIDE (IP): Performed by: NURSE PRACTITIONER

## 2018-12-29 PROCEDURE — 85025 COMPLETE CBC W/AUTO DIFF WBC: CPT

## 2018-12-29 PROCEDURE — 71045 X-RAY EXAM CHEST 1 VIEW: CPT

## 2018-12-29 PROCEDURE — 700111 HCHG RX REV CODE 636 W/ 250 OVERRIDE (IP): Performed by: SURGERY

## 2018-12-29 PROCEDURE — 94760 N-INVAS EAR/PLS OXIMETRY 1: CPT

## 2018-12-29 PROCEDURE — 770006 HCHG ROOM/CARE - MED/SURG/GYN SEMI*

## 2018-12-29 PROCEDURE — 72040 X-RAY EXAM NECK SPINE 2-3 VW: CPT

## 2018-12-29 PROCEDURE — 36415 COLL VENOUS BLD VENIPUNCTURE: CPT

## 2018-12-29 RX ORDER — LIDOCAINE 50 MG/G
2 PATCH TOPICAL EVERY 24 HOURS
Status: DISCONTINUED | OUTPATIENT
Start: 2018-12-29 | End: 2019-01-02 | Stop reason: HOSPADM

## 2018-12-29 RX ADMIN — Medication: at 13:27

## 2018-12-29 RX ADMIN — SODIUM CHLORIDE, POTASSIUM CHLORIDE, SODIUM LACTATE AND CALCIUM CHLORIDE: 600; 310; 30; 20 INJECTION, SOLUTION INTRAVENOUS at 02:04

## 2018-12-29 RX ADMIN — LIDOCAINE 2 PATCH: 50 PATCH TOPICAL at 16:07

## 2018-12-29 RX ADMIN — ENOXAPARIN SODIUM 30 MG: 100 INJECTION SUBCUTANEOUS at 06:11

## 2018-12-29 RX ADMIN — SODIUM CHLORIDE, POTASSIUM CHLORIDE, SODIUM LACTATE AND CALCIUM CHLORIDE: 600; 310; 30; 20 INJECTION, SOLUTION INTRAVENOUS at 18:32

## 2018-12-29 RX ADMIN — SODIUM CHLORIDE, POTASSIUM CHLORIDE, SODIUM LACTATE AND CALCIUM CHLORIDE: 600; 310; 30; 20 INJECTION, SOLUTION INTRAVENOUS at 10:12

## 2018-12-29 RX ADMIN — FAMOTIDINE 20 MG: 10 INJECTION INTRAVENOUS at 17:43

## 2018-12-29 RX ADMIN — FAMOTIDINE 20 MG: 10 INJECTION INTRAVENOUS at 06:11

## 2018-12-29 RX ADMIN — ENOXAPARIN SODIUM 30 MG: 100 INJECTION SUBCUTANEOUS at 17:43

## 2018-12-29 ASSESSMENT — ENCOUNTER SYMPTOMS
VOMITING: 0
NECK PAIN: 1
TREMORS: 0
DIZZINESS: 0
BACK PAIN: 0
TINGLING: 0
FEVER: 0
SHORTNESS OF BREATH: 0
CHILLS: 0
DOUBLE VISION: 0
ABDOMINAL PAIN: 1
SPEECH CHANGE: 0
SENSORY CHANGE: 0
BLURRED VISION: 0
ROS GI COMMENTS: BM PRIOR TO ARRIVAL
MYALGIAS: 1
HEADACHES: 0
FOCAL WEAKNESS: 0
NAUSEA: 0

## 2018-12-29 ASSESSMENT — PAIN SCALES - GENERAL
PAINLEVEL_OUTOF10: 8
PAINLEVEL_OUTOF10: 6
PAINLEVEL_OUTOF10: 3
PAINLEVEL_OUTOF10: 6
PAINLEVEL_OUTOF10: 6
PAINLEVEL_OUTOF10: 7
PAINLEVEL_OUTOF10: 0

## 2018-12-29 ASSESSMENT — LIFESTYLE VARIABLES: SUBSTANCE_ABUSE: 1

## 2018-12-29 NOTE — PROGRESS NOTES
Neurosurgery Progress Note    Subjective:  No acute events overnight.     Exam:  A&O x3. Fluent Speech.   3mm PERRL, EOMI. Denies blurry or double vision.   Tongue midline without fasciculation. No facial droop.   Hearing intact.   Strength: Bilateral shoulder shrug, bicep, tricep, deltoid,  5/5. No Zacarias's                  Bilateral IP, DF, PF 5/5. No Clonus.   Sensation: Intact throughout.   Incision: facial lac c/d with sutures  NG tube. Barry.   Denies n/v.  Pain controlled.       BP  Min: 134/75  Max: 144/90  Pulse  Av.6  Min: 63  Max: 98  Resp  Avg: 15.8  Min: 10  Max: 30  Temp  Av.5 °C (97.7 °F)  Min: 36.1 °C (97 °F)  Max: 37.7 °C (99.9 °F)  SpO2  Av.2 %  Min: 94 %  Max: 100 %    No Data Recorded    Recent Labs      18   0530   WBC  9.7  9.8   RBC  4.30  3.88*   HEMOGLOBIN  13.7  12.5   HEMATOCRIT  42.2  37.0   MCV  98.1*  95.4   MCH  31.9  32.2   MCHC  32.5*  33.8   RDW  45.9  44.5   PLATELETCT  298  220   MPV  9.6  9.6     Recent Labs      18   0530   SODIUM  140  140   POTASSIUM  3.6  3.6   CHLORIDE  109  109   CO2  19*  21   GLUCOSE  121*  114*   BUN  17  14   CREATININE  0.90  0.54   CALCIUM  8.8  8.2*     Recent Labs      18   APTT  23.7*   INR  1.09     Recent Labs      18   REACTMIN  4.6*   CLOTKINET  1.5   CLOTANGL  68.3   MAXCLOTS  58.1   [id number]  0.0   PRCINADP  90.9   PRCINAA  68.5       Intake/Output       18 0700 - 18 0659 18 0700 - 18 0659      3705-1559 6123-9138 Total 2272-7260 1935-6513 Total       Intake    I.V.  --  3043.8 3043.8  1250  -- 1250    Crystalloid Intake -- 2000 -- -- --    Pre-Hospital Volume -- 400 400 -- -- --    Trauma Resuscitation Volume -- 0 0 -- -- --    Magnesium Sulfate Volume -- 50 50 -- -- --    Volume (mL) (LR infusion) -- 593.8 593.8 1250 -- 1250    Blood  --  0 0  --  -- --    PRBC Total Volume (Non-Barcoded) -- 0 0 -- -- --    FFP Total  Volume (Non-Barcoded) -- 0 0 -- -- --    Platelets Total Volume (Non-Barcoded) -- 0 0 -- -- --    Cryoprecipitate (Pooled) Total Volume (Non-Barcoded) -- 0 0 -- -- --    Cryoprecipitate (Single) Total Volume (Non-Barcoded) -- 0 0 -- -- --    Total Intake -- 3043.8 3043.8 1250 -- 1250       Output    Urine  --  925 925  600  -- 600    Output (mL) (Urethral Catheter Straight-tip 16 Fr.) -- 925 925 600 -- 600    Other  --  0 0  --  -- --    Pre-Hospital Output -- 0 0 -- -- --    Trauma Resuscitation Output -- 0 0 -- -- --    Stool  --  -- --  0  -- 0    Number of Times Stooled -- -- -- 0 x -- 0 x    Measurable Stool (mL) -- -- -- 0 -- 0    Emesis/NG output  --  10 10  --  -- --    Output (mL) (Enteral Tube 12/28/18 Nasogastric Left nare) -- 10 10 -- -- --    Blood  --  50 50  --  -- --    Est. Blood Loss (mL) -- 50 50 -- -- --    Total Output -- 985 985 600 -- 600       Net I/O     -- 2058.8 2058.8 650 -- 650            Intake/Output Summary (Last 24 hours) at 12/28/18 1653  Last data filed at 12/28/18 1600   Gross per 24 hour   Intake          4293.75 ml   Output             1585 ml   Net          2708.75 ml            • Respiratory Care per Protocol   Continuous RT   • Pharmacy Consult Request  1 Each PRN   • LR   Continuous   • ondansetron  4 mg Q4HRS PRN   • HYDROmorphone   Continuous   • enoxaparin (LOVENOX) injection  30 mg Q12HRS   • famotidine  20 mg BID       Assessment and Plan:  Hospital day #1  POD #na  Prophylactic anticoagulation: yes         Start date/time: Lovenox     Plan:  Cervical collar for C7 transverse process fracture  OK to tx out of ICU from Neurosurgery pov

## 2018-12-29 NOTE — PROGRESS NOTES
"Report received from day RN, assumed Care.   Patient is AOx4, responds appropriately.  Denies SOB.    Pain controlled at this time with PCA pump. Cardiac monitoring in place for PVC's  Patient is NPO, denies nausea/vomiting. Last BM PTA. + void with beckett in place  Up self with steady gait.    NG tube, clamped to L nare. C-collar in place. Midline incision with island dressing in place, dressing CDI. Skin tear on L clavicle and generalized bruising on LLE.    /92   Pulse 77   Temp 36.8 °C (98.3 °F) (Temporal)   Resp 18   Ht 1.676 m (5' 6\")   Wt 63.5 kg (139 lb 15.9 oz)   LMP 06/01/2017 (Approximate)   SpO2 98%   Breastfeeding? No   BMI 22.60 kg/m²      Plan of care discussed, all questions answered.    Explained importance of calling before getting OOB and pt verbalizes understanding.       Call light and belongings within reach, treaded slipper socks on, SCD in use, bed in lowest locked position.  Hourly rounding in place, all needs met at this time  "

## 2018-12-29 NOTE — PROGRESS NOTES
2RN SKin Check   Generalized bruising on LLE .  Skin tear on left clavicle.   Sacrum is PWDI.   Large incision across right brow extening to left superior aspect of left eye, approximated by sutures.       Report received from PACU.   A/O X 4. 1L O2 by cannula.   VSS.   Labs reviewed.   Patient reports 4/10 pain level.   Patient using dilaudid PCA set at;   0 basal; 0.2mg/8min; 5mg/4hr  Education provided on PCA trigger use.   Cervical collar in place.   Bilateral AC PIVs in place.   Left SL.   Right running LR at 125 mL/hr with PCA.    BS hypoactive X 4.   Midline abdomen incision dressed with island dressing. Scant drainage in place.  Last BM reported 8/27/2018.   Barry catheter in place to be removed 12/29/2018 0000.  Cardiac monitoring in place for PVCs.   PHILLIP mobility at this time.     Pt wishes that GILES level not be discussed with any family or friends.   Call light and PCA trigger at bedside.

## 2018-12-29 NOTE — PROGRESS NOTES
Report given to receiving RNStefano henry transported to [facility] bed 2 on monitor with ACLS RN.

## 2018-12-29 NOTE — PROGRESS NOTES
Xray of cervical spine placed by neurosurgery. Radiology called this RN to stated pt does not meet requirements (ambulating and c-collar) to have x-ray. Neurosurgery notified, neurosurgery postponing x-ray until Sunday.

## 2018-12-29 NOTE — CARE PLAN
Problem: Safety  Goal: Will remain free from injury    Intervention: Provide assistance with mobility  Educated to call for assistance before getting OOB      Problem: Pain Management  Goal: Pain level will decrease to patient's comfort goal    Intervention: Follow pain managment plan developed in collaboration with patient and Interdisciplinary Team  Educated pt to verbalize when pain is not tolerable, assessing pain Q4 per protocol

## 2018-12-29 NOTE — PROGRESS NOTES
laceration (sutured) to forehead. Abrasion to L clavicle. Midline abdominal surgical incision with old dried drainage. Small abrasion to hand and fingers. Bruising to L flank, R upper thigh/knee. L knee bruising. mepilex in place on sacrum. No other areas of concern. C-collar in place with no breakdown noted underneath or on back of head.
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
## 2018-12-29 NOTE — PROGRESS NOTES
Monitor Summary    SR 87-99  Frequent PVC   Frequent Trigemini  Frequent Bigemini    .16/.10/.30    As per Monitor Summary Report

## 2018-12-29 NOTE — PROGRESS NOTES
Assumed care of patient from RN at 0700.     Reviewed VS, labs, orders, and notes.     Pt sitting up in bed. A&Ox 4. VSS.  MEWS Score: 0.     On 1 L oxygen. Denies SOB.  encouraged IS.    Moves all extremities, left arm splint and hard c-collar. Denies numbness or tingling.     Skin assessed over bony prominences and under medical devices.     Voiding, hypoactive BS, last BM PTA    NPO diet, denies nausea/ vomiting.     Wound(s): generalized briusing/ abrasions. Right upper face, dressing CDI, surgical incision forehead CLYDE; MLI island dressing CDI.     NG tube clamped per order.     Patient reports 7 /10 pain in abdomen, PCA in use and pt verbalized understanding of bolus button.    Pt. is 1 assist refusing to ambulate at this time and insisting in using bed pan.    Fall prevention education reinforced with pt. Pt is wearing treaded slipper socks, IV pole is on the same side as the bathroom, lower bedrails are down.    Discussed POC, all questions answered at this time. Bed is locked and in the lowest position, call light within reach, Q 1 hour rounding in place. All needs met at this time.
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
## 2018-12-30 ENCOUNTER — APPOINTMENT (OUTPATIENT)
Dept: RADIOLOGY | Facility: MEDICAL CENTER | Age: 48
DRG: 982 | End: 2018-12-30
Attending: SURGERY

## 2018-12-30 PROBLEM — I49.3 VENTRICULAR ECTOPY: Status: RESOLVED | Noted: 2018-12-28 | Resolved: 2018-12-30

## 2018-12-30 PROBLEM — E87.6 HYPOKALEMIA: Status: ACTIVE | Noted: 2018-12-30

## 2018-12-30 LAB
ANION GAP SERPL CALC-SCNC: 11 MMOL/L (ref 0–11.9)
BASOPHILS # BLD AUTO: 0.3 % (ref 0–1.8)
BASOPHILS # BLD: 0.03 K/UL (ref 0–0.12)
BUN SERPL-MCNC: 6 MG/DL (ref 8–22)
CALCIUM SERPL-MCNC: 8.8 MG/DL (ref 8.5–10.5)
CHLORIDE SERPL-SCNC: 102 MMOL/L (ref 96–112)
CO2 SERPL-SCNC: 25 MMOL/L (ref 20–33)
CREAT SERPL-MCNC: 0.37 MG/DL (ref 0.5–1.4)
EOSINOPHIL # BLD AUTO: 0.03 K/UL (ref 0–0.51)
EOSINOPHIL NFR BLD: 0.3 % (ref 0–6.9)
ERYTHROCYTE [DISTWIDTH] IN BLOOD BY AUTOMATED COUNT: 43.6 FL (ref 35.9–50)
GLUCOSE SERPL-MCNC: 85 MG/DL (ref 65–99)
HCT VFR BLD AUTO: 32.7 % (ref 37–47)
HGB BLD-MCNC: 10.8 G/DL (ref 12–16)
IMM GRANULOCYTES # BLD AUTO: 0.02 K/UL (ref 0–0.11)
IMM GRANULOCYTES NFR BLD AUTO: 0.2 % (ref 0–0.9)
LYMPHOCYTES # BLD AUTO: 1.09 K/UL (ref 1–4.8)
LYMPHOCYTES NFR BLD: 12.4 % (ref 22–41)
MCH RBC QN AUTO: 31.8 PG (ref 27–33)
MCHC RBC AUTO-ENTMCNC: 33 G/DL (ref 33.6–35)
MCV RBC AUTO: 96.2 FL (ref 81.4–97.8)
MONOCYTES # BLD AUTO: 0.3 K/UL (ref 0–0.85)
MONOCYTES NFR BLD AUTO: 3.4 % (ref 0–13.4)
NEUTROPHILS # BLD AUTO: 7.31 K/UL (ref 2–7.15)
NEUTROPHILS NFR BLD: 83.4 % (ref 44–72)
NRBC # BLD AUTO: 0 K/UL
NRBC BLD-RTO: 0 /100 WBC
PLATELET # BLD AUTO: 179 K/UL (ref 164–446)
PMV BLD AUTO: 9.8 FL (ref 9–12.9)
POTASSIUM SERPL-SCNC: 3.3 MMOL/L (ref 3.6–5.5)
RBC # BLD AUTO: 3.4 M/UL (ref 4.2–5.4)
SODIUM SERPL-SCNC: 138 MMOL/L (ref 135–145)
WBC # BLD AUTO: 8.8 K/UL (ref 4.8–10.8)

## 2018-12-30 PROCEDURE — 700111 HCHG RX REV CODE 636 W/ 250 OVERRIDE (IP): Performed by: SURGERY

## 2018-12-30 PROCEDURE — 700111 HCHG RX REV CODE 636 W/ 250 OVERRIDE (IP): Performed by: NURSE PRACTITIONER

## 2018-12-30 PROCEDURE — 36415 COLL VENOUS BLD VENIPUNCTURE: CPT

## 2018-12-30 PROCEDURE — 770006 HCHG ROOM/CARE - MED/SURG/GYN SEMI*

## 2018-12-30 PROCEDURE — 700101 HCHG RX REV CODE 250: Performed by: NURSE PRACTITIONER

## 2018-12-30 PROCEDURE — 85025 COMPLETE CBC W/AUTO DIFF WBC: CPT

## 2018-12-30 PROCEDURE — 71045 X-RAY EXAM CHEST 1 VIEW: CPT

## 2018-12-30 PROCEDURE — 700102 HCHG RX REV CODE 250 W/ 637 OVERRIDE(OP): Performed by: SURGERY

## 2018-12-30 PROCEDURE — 80048 BASIC METABOLIC PNL TOTAL CA: CPT

## 2018-12-30 PROCEDURE — 700102 HCHG RX REV CODE 250 W/ 637 OVERRIDE(OP): Performed by: NURSE PRACTITIONER

## 2018-12-30 PROCEDURE — 700105 HCHG RX REV CODE 258: Performed by: SURGERY

## 2018-12-30 PROCEDURE — A9270 NON-COVERED ITEM OR SERVICE: HCPCS | Performed by: SURGERY

## 2018-12-30 PROCEDURE — A9270 NON-COVERED ITEM OR SERVICE: HCPCS | Performed by: NURSE PRACTITIONER

## 2018-12-30 RX ORDER — POTASSIUM CHLORIDE 20 MEQ/1
20 TABLET, EXTENDED RELEASE ORAL DAILY
Status: DISCONTINUED | OUTPATIENT
Start: 2018-12-30 | End: 2018-12-31

## 2018-12-30 RX ORDER — ACETAMINOPHEN 325 MG/1
650 TABLET ORAL 4 TIMES DAILY
Status: DISCONTINUED | OUTPATIENT
Start: 2018-12-30 | End: 2019-01-02 | Stop reason: HOSPADM

## 2018-12-30 RX ORDER — OXYCODONE HYDROCHLORIDE 10 MG/1
10 TABLET ORAL EVERY 4 HOURS PRN
Status: DISCONTINUED | OUTPATIENT
Start: 2018-12-30 | End: 2019-01-02 | Stop reason: HOSPADM

## 2018-12-30 RX ORDER — KETOROLAC TROMETHAMINE 30 MG/ML
15 INJECTION, SOLUTION INTRAMUSCULAR; INTRAVENOUS EVERY 6 HOURS
Status: DISPENSED | OUTPATIENT
Start: 2018-12-30 | End: 2019-01-02

## 2018-12-30 RX ORDER — OXYCODONE HYDROCHLORIDE 5 MG/1
5 TABLET ORAL EVERY 4 HOURS PRN
Status: DISCONTINUED | OUTPATIENT
Start: 2018-12-30 | End: 2019-01-02 | Stop reason: HOSPADM

## 2018-12-30 RX ORDER — POLYETHYLENE GLYCOL 3350 17 G/17G
1 POWDER, FOR SOLUTION ORAL 2 TIMES DAILY
Status: DISCONTINUED | OUTPATIENT
Start: 2018-12-30 | End: 2019-01-02 | Stop reason: HOSPADM

## 2018-12-30 RX ORDER — KETOROLAC TROMETHAMINE 30 MG/ML
30 INJECTION, SOLUTION INTRAMUSCULAR; INTRAVENOUS ONCE
Status: COMPLETED | OUTPATIENT
Start: 2018-12-30 | End: 2018-12-30

## 2018-12-30 RX ORDER — HYDROMORPHONE HYDROCHLORIDE 1 MG/ML
.5-1 INJECTION, SOLUTION INTRAMUSCULAR; INTRAVENOUS; SUBCUTANEOUS
Status: DISCONTINUED | OUTPATIENT
Start: 2018-12-30 | End: 2019-01-02

## 2018-12-30 RX ADMIN — KETOROLAC TROMETHAMINE 15 MG: 30 INJECTION, SOLUTION INTRAMUSCULAR at 17:42

## 2018-12-30 RX ADMIN — ENOXAPARIN SODIUM 30 MG: 100 INJECTION SUBCUTANEOUS at 04:38

## 2018-12-30 RX ADMIN — FAMOTIDINE 20 MG: 10 INJECTION INTRAVENOUS at 17:42

## 2018-12-30 RX ADMIN — LIDOCAINE 2 PATCH: 50 PATCH TOPICAL at 16:42

## 2018-12-30 RX ADMIN — SODIUM CHLORIDE, POTASSIUM CHLORIDE, SODIUM LACTATE AND CALCIUM CHLORIDE: 600; 310; 30; 20 INJECTION, SOLUTION INTRAVENOUS at 02:52

## 2018-12-30 RX ADMIN — OXYCODONE HYDROCHLORIDE 10 MG: 10 TABLET ORAL at 09:59

## 2018-12-30 RX ADMIN — POTASSIUM CHLORIDE 20 MEQ: 1500 TABLET, EXTENDED RELEASE ORAL at 12:39

## 2018-12-30 RX ADMIN — OXYCODONE HYDROCHLORIDE 10 MG: 10 TABLET ORAL at 21:06

## 2018-12-30 RX ADMIN — Medication: at 04:43

## 2018-12-30 RX ADMIN — ACETAMINOPHEN 650 MG: 325 TABLET, FILM COATED ORAL at 16:42

## 2018-12-30 RX ADMIN — KETOROLAC TROMETHAMINE 30 MG: 30 INJECTION, SOLUTION INTRAMUSCULAR at 09:59

## 2018-12-30 RX ADMIN — ACETAMINOPHEN 650 MG: 325 TABLET, FILM COATED ORAL at 21:06

## 2018-12-30 RX ADMIN — ACETAMINOPHEN 650 MG: 325 TABLET, FILM COATED ORAL at 09:59

## 2018-12-30 RX ADMIN — ACETAMINOPHEN 650 MG: 325 TABLET, FILM COATED ORAL at 12:39

## 2018-12-30 RX ADMIN — OXYCODONE HYDROCHLORIDE 5 MG: 5 TABLET ORAL at 16:42

## 2018-12-30 RX ADMIN — FAMOTIDINE 20 MG: 10 INJECTION INTRAVENOUS at 04:38

## 2018-12-30 RX ADMIN — POLYETHYLENE GLYCOL 3350 1 PACKET: 17 POWDER, FOR SOLUTION ORAL at 12:39

## 2018-12-30 RX ADMIN — ENOXAPARIN SODIUM 30 MG: 100 INJECTION SUBCUTANEOUS at 17:42

## 2018-12-30 RX ADMIN — KETOROLAC TROMETHAMINE 15 MG: 30 INJECTION, SOLUTION INTRAMUSCULAR at 12:39

## 2018-12-30 RX ADMIN — POLYETHYLENE GLYCOL 3350 1 PACKET: 17 POWDER, FOR SOLUTION ORAL at 17:55

## 2018-12-30 ASSESSMENT — PAIN SCALES - GENERAL
PAINLEVEL_OUTOF10: 5
PAINLEVEL_OUTOF10: 0
PAINLEVEL_OUTOF10: 7
PAINLEVEL_OUTOF10: 4
PAINLEVEL_OUTOF10: ASSUMED PAIN PRESENT
PAINLEVEL_OUTOF10: 3
PAINLEVEL_OUTOF10: 7
PAINLEVEL_OUTOF10: 0
PAINLEVEL_OUTOF10: 4
PAINLEVEL_OUTOF10: 6
PAINLEVEL_OUTOF10: 0
PAINLEVEL_OUTOF10: 3

## 2018-12-30 ASSESSMENT — ENCOUNTER SYMPTOMS
NECK PAIN: 1
NAUSEA: 0
ABDOMINAL PAIN: 1
HEADACHES: 0
SPEECH CHANGE: 0
VOMITING: 0
SHORTNESS OF BREATH: 0
TINGLING: 0
TREMORS: 0
CHILLS: 0
ROS GI COMMENTS: BM PRIOR TO ARRIVAL
FOCAL WEAKNESS: 0
FEVER: 0
MYALGIAS: 1
DOUBLE VISION: 0
BLURRED VISION: 0
SENSORY CHANGE: 0
BACK PAIN: 0
DIZZINESS: 0

## 2018-12-30 ASSESSMENT — LIFESTYLE VARIABLES: SUBSTANCE_ABUSE: 1

## 2018-12-30 NOTE — PROGRESS NOTES
"Report received from day RN, assumed Care.   Patient is AOx4, responds appropriately.  Denies SOB.     Pain controlled at this time with PCA pump. Cardiac monitoring d/c'd per order.  Patient is NPO, denies nausea/vomiting. Last BM PTA. + void, - flatus.      C-collar in place. Midline incision with island dressing in place, dressing CDI. Skin tear on L clavicle and generalized bruising on LLE.     /110   Pulse 92   Temp 36.7 °C (98.1 °F) (Temporal)   Resp 18   Ht 1.676 m (5' 6\")   Wt 63.5 kg (139 lb 15.9 oz)   LMP 06/01/2017 (Approximate)   SpO2 97%   Breastfeeding? No   BMI 22.60 kg/m²        Plan of care discussed, all questions answered.    Explained importance of calling before getting OOB and pt verbalizes understanding.        Call light and belongings within reach, treaded slipper socks on, SCD in use, bed in lowest locked position.  Hourly rounding in place, all needs met at this time  "

## 2018-12-30 NOTE — PROGRESS NOTES
"Assumed care of patient from RN at 0700.      Reviewed VS, labs, orders, and notes.      Pt sitting up in bed. A&Ox 4. /108   Pulse 84   Temp 36.6 °C (97.9 °F) (Temporal)   Resp 17   Ht 1.676 m (5' 6\")   Wt 63.5 kg (139 lb 15.9 oz)   LMP 06/01/2017 (Approximate)   SpO2 97%   Breastfeeding? No   BMI 22.60 kg/m² . MEWS Score: 0.      On 1 L oxygen. Denies SOB.  encouraged IS.     Moves all extremities, left arm splint and hard c-collar. Denies numbness or tingling.      Skin assessed over bony prominences and under medical devices.      Voiding, hypoactive BS, last BM PTA     Clear liquid diet, denies nausea/ vomiting.      Wound(s): generalized briusing/ abrasions. Right upper face, dressing CDI, surgical incision forehead CLYDE; MLI staples CLYDE      Patient reports 6 /10 pain in abdomen, medicated per MAR.     Pt. is SBA with steady gait.      Fall prevention education reinforced with pt. Pt is wearing treaded slipper socks, IV pole is on the same side as the bathroom, lower bedrails are down.     Discussed POC, all questions answered at this time. Bed is locked and in the lowest position, call light within reach, Q 1 hour rounding in place. All needs met at this time.   "

## 2018-12-30 NOTE — PROGRESS NOTES
Neurosurgery Progress Note    Subjective:  No acute events    Exam:  A&O x3. Fluent Speech.   4mm PERRL, EOMI. Denies blurry or double vision.   Tongue midline without fasciculation. No facial droop.   Hearing intact.   Strength: Bilateral shoulder shrug, bicep, tricep, deltoid,  5/5. No Zacarias's                  Bilateral IP, DF, PF 5/5. No Clonus.   Sensation: Intact throughout. Eating, drinking. Denies n/v.  Voiding. +BM.  Pain controlled.   Ambulatory.     BP  Min: 154/108  Max: 178/106  Pulse  Av.7  Min: 63  Max: 98  Resp  Av.9  Min: 17  Max: 19  Temp  Av.8 °C (98.2 °F)  Min: 36.6 °C (97.9 °F)  Max: 36.8 °C (98.3 °F)  SpO2  Av.7 %  Min: 92 %  Max: 98 %    No Data Recorded    Recent Labs      18   0533   WBC  9.8  9.6  8.8   RBC  3.88*  3.38*  3.40*   HEMOGLOBIN  12.5  11.0*  10.8*   HEMATOCRIT  37.0  33.4*  32.7*   MCV  95.4  98.8*  96.2   MCH  32.2  32.5  31.8   MCHC  33.8  32.9*  33.0*   RDW  44.5  45.6  43.6   PLATELETCT  220  176  179   MPV  9.6  9.7  9.8     Recent Labs      1830  18   0533   SODIUM  140  140  138   POTASSIUM  3.6  3.6  3.3*   CHLORIDE  109  109  102   CO2  19*  21  25   GLUCOSE  121*  114*  85   BUN  17  14  6*   CREATININE  0.90  0.54  0.37*   CALCIUM  8.8  8.2*  8.8     Recent Labs      18   APTT  23.7*   INR  1.09     Recent Labs      18   REACTMIN  4.6*   CLOTKINET  1.5   CLOTANGL  68.3   MAXCLOTS  58.1   [id number]  0.0   PRCINADP  90.9   PRCINAA  68.5       Intake/Output       18 0700 - 18 0659 18 - 18 0659      1900-0659 Total 1900-0659 Total       Intake    P.O.  --  0 0  120  -- 120    P.O. -- 0 0 120 -- 120    I.V.  1530.5  1562.5 3093  510  -- 510    PCA End of Shift Total Volume (ml) 30.5 62.5 93 10 -- 10    Volume (mL) (LR infusion) 1500 1500 3000 500 -- 500    Total Intake 1530.5 1562.5 3093 630 -- 630        Output    Urine  1100  300 1400  --  -- --    Number of Times Voided 2 x 3 x 5 x 2 x -- 2 x    Urine Void (mL) 2846 072 6238 -- -- --    Stool  --  -- --  --  -- --    Number of Times Stooled 0 x 0 x 0 x 0 x -- 0 x    Total Output 5825 303 2494 -- -- --       Net I/O     430.5 1262.5 1693 630 -- 630            Intake/Output Summary (Last 24 hours) at 12/30/18 1437  Last data filed at 12/30/18 0956   Gross per 24 hour   Intake             2723 ml   Output              600 ml   Net             2123 ml            • acetaminophen  650 mg 4X/DAY   • ketorolac  15 mg Q6HRS   • oxyCODONE immediate-release  5 mg Q4HRS PRN    Or   • oxyCODONE immediate-release  10 mg Q4HRS PRN   • HYDROmorphone  0.5-1 mg Q3HRS PRN   • polyethylene glycol/lytes  1 Packet BID   • potassium chloride SA  20 mEq DAILY   • lidocaine  2 Patch Q24HR   • Respiratory Care per Protocol   Continuous RT   • Pharmacy Consult Request  1 Each PRN   • ondansetron  4 mg Q4HRS PRN   • enoxaparin (LOVENOX) injection  30 mg Q12HRS   • famotidine  20 mg BID       Assessment and Plan:  Hospital day #3  POD #na   Prophylactic anticoagulation: Lovenox           Plan:  Cervical x-rays reviewed by Dr Villar  Patient is to remain in cervical collar for 6 weeks and follow up in clinic with ap/lat/flex/ext cervical x-rays completed within week of scheduled appointment  Office will contact patient to schedule an appointment  Neurosurgery will sign off.   Please call with any further questions
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
## 2018-12-30 NOTE — CARE PLAN
Problem: Safety  Goal: Will remain free from injury    Intervention: Provide assistance with mobility  Educated pt to call for assistance before getting OOB      Problem: Respiratory:  Goal: Respiratory status will improve    Intervention: Educate and encourage incentive spirometry usage  Educated pt to increase use of IS and try to use IS on each commercial break to promote lung expansion

## 2018-12-31 ENCOUNTER — APPOINTMENT (OUTPATIENT)
Dept: RADIOLOGY | Facility: MEDICAL CENTER | Age: 48
DRG: 982 | End: 2018-12-31
Attending: SURGERY

## 2018-12-31 LAB
ANION GAP SERPL CALC-SCNC: 14 MMOL/L (ref 0–11.9)
BASOPHILS # BLD AUTO: 0.2 % (ref 0–1.8)
BASOPHILS # BLD: 0.02 K/UL (ref 0–0.12)
BUN SERPL-MCNC: 6 MG/DL (ref 8–22)
CALCIUM SERPL-MCNC: 9.4 MG/DL (ref 8.5–10.5)
CHLORIDE SERPL-SCNC: 101 MMOL/L (ref 96–112)
CO2 SERPL-SCNC: 21 MMOL/L (ref 20–33)
CREAT SERPL-MCNC: 0.38 MG/DL (ref 0.5–1.4)
EOSINOPHIL # BLD AUTO: 0.08 K/UL (ref 0–0.51)
EOSINOPHIL NFR BLD: 0.9 % (ref 0–6.9)
ERYTHROCYTE [DISTWIDTH] IN BLOOD BY AUTOMATED COUNT: 40.8 FL (ref 35.9–50)
GLUCOSE SERPL-MCNC: 93 MG/DL (ref 65–99)
HCT VFR BLD AUTO: 33.9 % (ref 37–47)
HGB BLD-MCNC: 11.6 G/DL (ref 12–16)
IMM GRANULOCYTES # BLD AUTO: 0.04 K/UL (ref 0–0.11)
IMM GRANULOCYTES NFR BLD AUTO: 0.5 % (ref 0–0.9)
LYMPHOCYTES # BLD AUTO: 0.61 K/UL (ref 1–4.8)
LYMPHOCYTES NFR BLD: 7.2 % (ref 22–41)
MCH RBC QN AUTO: 32 PG (ref 27–33)
MCHC RBC AUTO-ENTMCNC: 34.2 G/DL (ref 33.6–35)
MCV RBC AUTO: 93.4 FL (ref 81.4–97.8)
MONOCYTES # BLD AUTO: 0.38 K/UL (ref 0–0.85)
MONOCYTES NFR BLD AUTO: 4.5 % (ref 0–13.4)
NEUTROPHILS # BLD AUTO: 7.37 K/UL (ref 2–7.15)
NEUTROPHILS NFR BLD: 86.7 % (ref 44–72)
NRBC # BLD AUTO: 0 K/UL
NRBC BLD-RTO: 0 /100 WBC
PLATELET # BLD AUTO: 207 K/UL (ref 164–446)
PMV BLD AUTO: 9.7 FL (ref 9–12.9)
POTASSIUM SERPL-SCNC: 3.1 MMOL/L (ref 3.6–5.5)
RBC # BLD AUTO: 3.63 M/UL (ref 4.2–5.4)
SODIUM SERPL-SCNC: 136 MMOL/L (ref 135–145)
WBC # BLD AUTO: 8.5 K/UL (ref 4.8–10.8)

## 2018-12-31 PROCEDURE — 700102 HCHG RX REV CODE 250 W/ 637 OVERRIDE(OP): Performed by: SURGERY

## 2018-12-31 PROCEDURE — 700111 HCHG RX REV CODE 636 W/ 250 OVERRIDE (IP): Performed by: SURGERY

## 2018-12-31 PROCEDURE — 36415 COLL VENOUS BLD VENIPUNCTURE: CPT

## 2018-12-31 PROCEDURE — G8988 SELF CARE GOAL STATUS: HCPCS | Mod: CI

## 2018-12-31 PROCEDURE — A9270 NON-COVERED ITEM OR SERVICE: HCPCS | Performed by: SURGERY

## 2018-12-31 PROCEDURE — 700101 HCHG RX REV CODE 250: Performed by: NURSE PRACTITIONER

## 2018-12-31 PROCEDURE — 97161 PT EVAL LOW COMPLEX 20 MIN: CPT

## 2018-12-31 PROCEDURE — G8979 MOBILITY GOAL STATUS: HCPCS | Mod: CH

## 2018-12-31 PROCEDURE — A9270 NON-COVERED ITEM OR SERVICE: HCPCS | Performed by: NURSE PRACTITIONER

## 2018-12-31 PROCEDURE — 94760 N-INVAS EAR/PLS OXIMETRY 1: CPT

## 2018-12-31 PROCEDURE — G8989 SELF CARE D/C STATUS: HCPCS | Mod: CI

## 2018-12-31 PROCEDURE — 85025 COMPLETE CBC W/AUTO DIFF WBC: CPT

## 2018-12-31 PROCEDURE — 80048 BASIC METABOLIC PNL TOTAL CA: CPT

## 2018-12-31 PROCEDURE — 770006 HCHG ROOM/CARE - MED/SURG/GYN SEMI*

## 2018-12-31 PROCEDURE — G8978 MOBILITY CURRENT STATUS: HCPCS | Mod: CH

## 2018-12-31 PROCEDURE — 97165 OT EVAL LOW COMPLEX 30 MIN: CPT

## 2018-12-31 PROCEDURE — 71045 X-RAY EXAM CHEST 1 VIEW: CPT

## 2018-12-31 PROCEDURE — 700102 HCHG RX REV CODE 250 W/ 637 OVERRIDE(OP): Performed by: NURSE PRACTITIONER

## 2018-12-31 PROCEDURE — G8987 SELF CARE CURRENT STATUS: HCPCS | Mod: CI

## 2018-12-31 PROCEDURE — G8980 MOBILITY D/C STATUS: HCPCS | Mod: CH

## 2018-12-31 PROCEDURE — 700111 HCHG RX REV CODE 636 W/ 250 OVERRIDE (IP): Performed by: NURSE PRACTITIONER

## 2018-12-31 RX ORDER — POTASSIUM CHLORIDE 20 MEQ/1
20 TABLET, EXTENDED RELEASE ORAL 2 TIMES DAILY
Status: DISCONTINUED | OUTPATIENT
Start: 2018-12-31 | End: 2019-01-02 | Stop reason: HOSPADM

## 2018-12-31 RX ADMIN — ENOXAPARIN SODIUM 30 MG: 100 INJECTION SUBCUTANEOUS at 05:43

## 2018-12-31 RX ADMIN — ACETAMINOPHEN 650 MG: 325 TABLET, FILM COATED ORAL at 12:15

## 2018-12-31 RX ADMIN — KETOROLAC TROMETHAMINE 15 MG: 30 INJECTION, SOLUTION INTRAMUSCULAR at 17:59

## 2018-12-31 RX ADMIN — KETOROLAC TROMETHAMINE 15 MG: 30 INJECTION, SOLUTION INTRAMUSCULAR at 00:24

## 2018-12-31 RX ADMIN — FAMOTIDINE 20 MG: 10 INJECTION INTRAVENOUS at 17:59

## 2018-12-31 RX ADMIN — ACETAMINOPHEN 650 MG: 325 TABLET, FILM COATED ORAL at 17:59

## 2018-12-31 RX ADMIN — OXYCODONE HYDROCHLORIDE 10 MG: 10 TABLET ORAL at 17:59

## 2018-12-31 RX ADMIN — OXYCODONE HYDROCHLORIDE 10 MG: 10 TABLET ORAL at 22:11

## 2018-12-31 RX ADMIN — ENOXAPARIN SODIUM 30 MG: 100 INJECTION SUBCUTANEOUS at 17:59

## 2018-12-31 RX ADMIN — ACETAMINOPHEN 650 MG: 325 TABLET, FILM COATED ORAL at 22:11

## 2018-12-31 RX ADMIN — KETOROLAC TROMETHAMINE 15 MG: 30 INJECTION, SOLUTION INTRAMUSCULAR at 12:15

## 2018-12-31 RX ADMIN — OXYCODONE HYDROCHLORIDE 10 MG: 10 TABLET ORAL at 12:14

## 2018-12-31 RX ADMIN — OXYCODONE HYDROCHLORIDE 10 MG: 10 TABLET ORAL at 07:38

## 2018-12-31 RX ADMIN — OXYCODONE HYDROCHLORIDE 10 MG: 10 TABLET ORAL at 03:06

## 2018-12-31 RX ADMIN — KETOROLAC TROMETHAMINE 15 MG: 30 INJECTION, SOLUTION INTRAMUSCULAR at 05:43

## 2018-12-31 RX ADMIN — POTASSIUM CHLORIDE 20 MEQ: 1500 TABLET, EXTENDED RELEASE ORAL at 05:43

## 2018-12-31 RX ADMIN — LIDOCAINE 2 PATCH: 50 PATCH TOPICAL at 12:15

## 2018-12-31 RX ADMIN — POLYETHYLENE GLYCOL 3350 1 PACKET: 17 POWDER, FOR SOLUTION ORAL at 05:43

## 2018-12-31 RX ADMIN — FAMOTIDINE 20 MG: 10 INJECTION INTRAVENOUS at 05:43

## 2018-12-31 RX ADMIN — POTASSIUM CHLORIDE 20 MEQ: 1500 TABLET, EXTENDED RELEASE ORAL at 17:59

## 2018-12-31 RX ADMIN — POLYETHYLENE GLYCOL 3350 1 PACKET: 17 POWDER, FOR SOLUTION ORAL at 17:59

## 2018-12-31 ASSESSMENT — COGNITIVE AND FUNCTIONAL STATUS - GENERAL
TOILETING: A LITTLE
SUGGESTED CMS G CODE MODIFIER DAILY ACTIVITY: CK
DAILY ACTIVITIY SCORE: 19
DRESSING REGULAR UPPER BODY CLOTHING: A LITTLE
MOBILITY SCORE: 24
HELP NEEDED FOR BATHING: A LITTLE
PERSONAL GROOMING: A LITTLE
SUGGESTED CMS G CODE MODIFIER MOBILITY: CH
DRESSING REGULAR LOWER BODY CLOTHING: A LITTLE

## 2018-12-31 ASSESSMENT — ACTIVITIES OF DAILY LIVING (ADL): TOILETING: INDEPENDENT

## 2018-12-31 ASSESSMENT — ENCOUNTER SYMPTOMS
BACK PAIN: 0
VOMITING: 0
TREMORS: 0
MYALGIAS: 1
DOUBLE VISION: 0
BLURRED VISION: 0
SENSORY CHANGE: 0
FEVER: 0
ABDOMINAL PAIN: 1
DIZZINESS: 0
CHILLS: 0
SPEECH CHANGE: 0
TINGLING: 0
SHORTNESS OF BREATH: 0
NECK PAIN: 1
HEADACHES: 0
FOCAL WEAKNESS: 0
NAUSEA: 0

## 2018-12-31 ASSESSMENT — GAIT ASSESSMENTS
GAIT LEVEL OF ASSIST: MODIFIED INDEPENDENT
DISTANCE (FEET): 45

## 2018-12-31 ASSESSMENT — PAIN SCALES - GENERAL
PAINLEVEL_OUTOF10: 7
PAINLEVEL_OUTOF10: 6
PAINLEVEL_OUTOF10: 5
PAINLEVEL_OUTOF10: 6
PAINLEVEL_OUTOF10: 7
PAINLEVEL_OUTOF10: 3
PAINLEVEL_OUTOF10: 5
PAINLEVEL_OUTOF10: 5
PAINLEVEL_OUTOF10: 6
PAINLEVEL_OUTOF10: 3
PAINLEVEL_OUTOF10: 8

## 2018-12-31 ASSESSMENT — LIFESTYLE VARIABLES: SUBSTANCE_ABUSE: 1
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
## 2018-12-31 NOTE — CARE PLAN
Problem: Communication  Goal: The ability to communicate needs accurately and effectively will improve  Outcome: PROGRESSING AS EXPECTED  Uses call light and communicates needs effectively    Problem: Medication  Goal: Compliance with prescribed medication will improve  Outcome: PROGRESSING AS EXPECTED  Patient is complying with medications
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
## 2018-12-31 NOTE — PROGRESS NOTES
"Assumed care of patient from RN at 0700.      Reviewed VS, labs, orders, and notes.      Pt sitting up in bed. A&Ox 4. /108   Pulse 84   Temp 36.6 °C (97.9 °F) (Temporal)   Resp 17   Ht 1.676 m (5' 6\")   Wt 63.5 kg (139 lb 15.9 oz)   LMP 06/01/2017 (Approximate)   SpO2 97%   Breastfeeding? No   BMI 22.60 kg/m² . MEWS Score: 0.      On room air. Denies SOB.  Encouraged IS.     Moves all extremities, Clever C-collar in place. Denies numbness or tingling.      Skin assessed over bony prominences and under medical devices.      Voiding, normoactve BS, last BM PTA     Clear liquid diet, denies nausea/ vomiting.      Wound(s): generalized briusing/ abrasions. Surgical incision forehead CLYDE; MLI staples CLYDE.      Patient reports 6 /10 pain in abdomen, medicated per MAR.     Pt. is SBA with steady gait.      Fall prevention education reinforced with pt. Pt is wearing treaded slipper socks, lower bedrails are down.     Discussed POC, all questions answered at this time. Bed is locked and in the lowest position, call light within reach, Q 1 hour rounding in place. All needs met at this time.   "

## 2018-12-31 NOTE — PROGRESS NOTES
Received bedside report and assumed care at 1900    Pt is A&O x4  Pain 8/10, medicated per MAR  Denies nausea  Tolerating clear liquid diet  + Urine Output  + Flatus  - BM   C collar in place  Midline abdominal incision CLYDE  Incision to left face, CDI  Up with x1 assistance  Bed in lowest position and locked.  Reviewed plan of care with patient, pt resting comfortably now, call light within reach, all needs met at this time

## 2019-01-01 ENCOUNTER — APPOINTMENT (OUTPATIENT)
Dept: RADIOLOGY | Facility: MEDICAL CENTER | Age: 49
DRG: 982 | End: 2019-01-01
Attending: SURGERY

## 2019-01-01 PROCEDURE — 700105 HCHG RX REV CODE 258

## 2019-01-01 PROCEDURE — 700111 HCHG RX REV CODE 636 W/ 250 OVERRIDE (IP): Performed by: NURSE PRACTITIONER

## 2019-01-01 PROCEDURE — 71045 X-RAY EXAM CHEST 1 VIEW: CPT

## 2019-01-01 PROCEDURE — A9270 NON-COVERED ITEM OR SERVICE: HCPCS | Performed by: NURSE PRACTITIONER

## 2019-01-01 PROCEDURE — 94760 N-INVAS EAR/PLS OXIMETRY 1: CPT

## 2019-01-01 PROCEDURE — 770006 HCHG ROOM/CARE - MED/SURG/GYN SEMI*

## 2019-01-01 PROCEDURE — 700102 HCHG RX REV CODE 250 W/ 637 OVERRIDE(OP): Performed by: SURGERY

## 2019-01-01 PROCEDURE — 700102 HCHG RX REV CODE 250 W/ 637 OVERRIDE(OP): Performed by: NURSE PRACTITIONER

## 2019-01-01 PROCEDURE — A9270 NON-COVERED ITEM OR SERVICE: HCPCS | Performed by: SURGERY

## 2019-01-01 PROCEDURE — 700111 HCHG RX REV CODE 636 W/ 250 OVERRIDE (IP): Performed by: SURGERY

## 2019-01-01 RX ORDER — POTASSIUM CHLORIDE 7.45 MG/ML
10 INJECTION INTRAVENOUS
Status: COMPLETED | OUTPATIENT
Start: 2019-01-01 | End: 2019-01-01

## 2019-01-01 RX ORDER — SODIUM CHLORIDE 9 MG/ML
INJECTION, SOLUTION INTRAVENOUS
Status: COMPLETED
Start: 2019-01-01 | End: 2019-01-01

## 2019-01-01 RX ADMIN — KETOROLAC TROMETHAMINE 15 MG: 30 INJECTION, SOLUTION INTRAMUSCULAR at 13:11

## 2019-01-01 RX ADMIN — POLYETHYLENE GLYCOL 3350 1 PACKET: 17 POWDER, FOR SOLUTION ORAL at 05:55

## 2019-01-01 RX ADMIN — POTASSIUM CHLORIDE 10 MEQ: 7.46 INJECTION, SOLUTION INTRAVENOUS at 14:38

## 2019-01-01 RX ADMIN — OXYCODONE HYDROCHLORIDE 10 MG: 10 TABLET ORAL at 02:54

## 2019-01-01 RX ADMIN — FAMOTIDINE 20 MG: 10 INJECTION INTRAVENOUS at 05:55

## 2019-01-01 RX ADMIN — ACETAMINOPHEN 650 MG: 325 TABLET, FILM COATED ORAL at 13:11

## 2019-01-01 RX ADMIN — ACETAMINOPHEN 650 MG: 325 TABLET, FILM COATED ORAL at 17:40

## 2019-01-01 RX ADMIN — KETOROLAC TROMETHAMINE 15 MG: 30 INJECTION, SOLUTION INTRAMUSCULAR at 17:44

## 2019-01-01 RX ADMIN — OXYCODONE HYDROCHLORIDE 5 MG: 5 TABLET ORAL at 08:57

## 2019-01-01 RX ADMIN — SODIUM CHLORIDE 500 ML: 9 INJECTION, SOLUTION INTRAVENOUS at 10:53

## 2019-01-01 RX ADMIN — ACETAMINOPHEN 650 MG: 325 TABLET, FILM COATED ORAL at 08:57

## 2019-01-01 RX ADMIN — OXYCODONE HYDROCHLORIDE 10 MG: 10 TABLET ORAL at 17:41

## 2019-01-01 RX ADMIN — ENOXAPARIN SODIUM 30 MG: 100 INJECTION SUBCUTANEOUS at 05:56

## 2019-01-01 RX ADMIN — KETOROLAC TROMETHAMINE 15 MG: 30 INJECTION, SOLUTION INTRAMUSCULAR at 05:55

## 2019-01-01 RX ADMIN — FAMOTIDINE 20 MG: 10 INJECTION INTRAVENOUS at 17:44

## 2019-01-01 RX ADMIN — POTASSIUM CHLORIDE 20 MEQ: 1500 TABLET, EXTENDED RELEASE ORAL at 05:55

## 2019-01-01 RX ADMIN — POTASSIUM CHLORIDE 20 MEQ: 1500 TABLET, EXTENDED RELEASE ORAL at 17:45

## 2019-01-01 RX ADMIN — POTASSIUM CHLORIDE 10 MEQ: 7.46 INJECTION, SOLUTION INTRAVENOUS at 10:43

## 2019-01-01 ASSESSMENT — ENCOUNTER SYMPTOMS
SPEECH CHANGE: 0
VOMITING: 0
NAUSEA: 0
ABDOMINAL PAIN: 1
CHILLS: 0
TREMORS: 0
MYALGIAS: 1
DIZZINESS: 0
SHORTNESS OF BREATH: 0
TINGLING: 0
SENSORY CHANGE: 0
FEVER: 0
HEADACHES: 0
BACK PAIN: 0
NECK PAIN: 1
FOCAL WEAKNESS: 0
BLURRED VISION: 0
DOUBLE VISION: 0

## 2019-01-01 ASSESSMENT — PAIN SCALES - GENERAL
PAINLEVEL_OUTOF10: 3
PAINLEVEL_OUTOF10: 5
PAINLEVEL_OUTOF10: 6
PAINLEVEL_OUTOF10: 5
PAINLEVEL_OUTOF10: 7
PAINLEVEL_OUTOF10: 8
PAINLEVEL_OUTOF10: 3
PAINLEVEL_OUTOF10: 5
PAINLEVEL_OUTOF10: 5

## 2019-01-01 ASSESSMENT — LIFESTYLE VARIABLES: SUBSTANCE_ABUSE: 1

## 2019-01-01 NOTE — PROGRESS NOTES
"Assumed care of patient from RN at 0700.      Reviewed VS, labs, orders, and notes.      /109 Comment: RN Notified  Pulse (!) 105   Temp 36.5 °C (97.7 °F) (Temporal)   Resp 16   Ht 1.676 m (5' 6\")   Wt 63.5 kg (139 lb 15.9 oz)   LMP 06/01/2017 (Approximate)   SpO2 95%   Breastfeeding? No   BMI 22.60 kg/m²  . MEWS Score: 1     On room air. Denies SOB.  Encouraged IS.     Moves all extremities, Cass J C-collar in place. Denies numbness or tingling.      Skin assessed over bony prominences and under medical devices.      Voiding, normoactve BS, last BM 1/1 small, formed, brown     Clear liquid diet, denies nausea/ vomiting.      Wound(s): generalized briusing/ abrasions. Surgical incision forehead CLYDE; MLI staples CLYDE.      Patient reports 5 /10 pain in abdomen, declined medication at this time     Pt. is SBA with steady gait.      Fall prevention education reinforced with pt. Pt is wearing treaded slipper socks, lower bedrails are down.     Discussed POC, all questions answered at this time. Bed is locked and in the lowest position, call light within reach, Q 1 hour rounding in place. All needs met at this time.  "
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
## 2019-01-02 VITALS
WEIGHT: 139.99 LBS | OXYGEN SATURATION: 95 % | BODY MASS INDEX: 22.5 KG/M2 | DIASTOLIC BLOOD PRESSURE: 106 MMHG | TEMPERATURE: 97.6 F | SYSTOLIC BLOOD PRESSURE: 158 MMHG | HEIGHT: 66 IN | RESPIRATION RATE: 18 BRPM | HEART RATE: 110 BPM

## 2019-01-02 PROBLEM — G89.18 ACUTE POSTOPERATIVE PAIN: Status: ACTIVE | Noted: 2019-01-02

## 2019-01-02 PROCEDURE — 700102 HCHG RX REV CODE 250 W/ 637 OVERRIDE(OP): Performed by: SURGERY

## 2019-01-02 PROCEDURE — A9270 NON-COVERED ITEM OR SERVICE: HCPCS | Performed by: SURGERY

## 2019-01-02 RX ORDER — OXYCODONE HYDROCHLORIDE 5 MG/1
5-10 TABLET ORAL EVERY 4 HOURS PRN
Qty: 40 TAB | Refills: 0 | Status: SHIPPED | OUTPATIENT
Start: 2019-01-02 | End: 2019-01-09

## 2019-01-02 RX ADMIN — OXYCODONE HYDROCHLORIDE 10 MG: 10 TABLET ORAL at 08:33

## 2019-01-02 RX ADMIN — ACETAMINOPHEN 650 MG: 325 TABLET, FILM COATED ORAL at 08:36

## 2019-01-02 ASSESSMENT — PAIN SCALES - GENERAL
PAINLEVEL_OUTOF10: 10
PAINLEVEL_OUTOF10: 7
PAINLEVEL_OUTOF10: 8

## 2019-01-02 ASSESSMENT — ENCOUNTER SYMPTOMS
BLURRED VISION: 0
DIZZINESS: 0
SPEECH CHANGE: 0
HEADACHES: 0
FOCAL WEAKNESS: 0
CHILLS: 0
BACK PAIN: 0
TREMORS: 0
FEVER: 0
MYALGIAS: 1
ABDOMINAL PAIN: 1
TINGLING: 0
NECK PAIN: 1
SHORTNESS OF BREATH: 0
DOUBLE VISION: 0
NAUSEA: 0
SENSORY CHANGE: 0
VOMITING: 0

## 2019-01-02 ASSESSMENT — LIFESTYLE VARIABLES: SUBSTANCE_ABUSE: 1

## 2019-01-02 NOTE — PROGRESS NOTES
"Pt A&Ox4, pt more compliant this AM after eating solid food this AM.  Medicated for pain, rates 7/10 to abdomen. Eager to d/c today.   Midline incision with staples, CLYDE. Sutures to facial lac, CLYDE.  Tolerating diet, denies n/v. + bowel sounds, + flatus, LBM 1/1 \"regular BM's\".  Saturating >90% on RA.  Pt ambulates independently, demonstrates steady gait. Wears C-collar most of the time, takes off to eat.   Updated on plan of care. Safety education provided. Bed locked in low. Call light within reach. Rounding in place.   "

## 2019-01-02 NOTE — PROGRESS NOTES
Discharging Patient home per physician order.  Discharged with friend.  Demonstrated understanding of follow up appointments, home medications, prescriptions, and nursing care instructions for rib fracture, s/s surgical site infection.  Ambulating without assistance, voiding without difficulty, pain well controlled, tolerating oral medications, oxygen saturation greater than 90% on RA, tolerating diet. All questions answered.  Patient able to state several reasons why to return to the ED or seek medical attention. Belongings with patient at time of discharge.    Facial sutures were removed prior to d/c. Wound well approximated. Pt tolerated well.

## 2019-01-02 NOTE — CARE PLAN
Problem: Bowel/Gastric:  Goal: Normal bowel function is maintained or improved  Outcome: PROGRESSING AS EXPECTED  Patient having BMs    Problem: Medication  Goal: Compliance with prescribed medication will improve  Outcome: PROGRESSING AS EXPECTED  Patient refusing all medications at this time until she is able to eat solid food

## 2019-01-02 NOTE — PROGRESS NOTES
Patient refusing to take any medications until she can eat a regular diet. Patient was educated on the importance of taking her medication. Patient continues to refuse
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
## 2019-01-02 NOTE — PROGRESS NOTES
No events  Pt is to be DC'd today  Laceration looks good  Sutures to be removed before DC  FU in 2 weeks

## 2019-01-02 NOTE — PROGRESS NOTES
Received bedside report and assumed care at 1900    Pt is A&O x4  Pain 5/10, refusing medication  Denies nausea  Tolerating clear liquid diet  + Urine Output  + Flatus  + BM   C collar in place  Midline abdominal incision CLYDE   Incision to left face, CDI  Up with standby assistance  Bed in lowest position and locked.  Reviewed plan of care with patient, pt resting comfortably now, call light within reach, all needs met at this time

## 2019-01-02 NOTE — PROGRESS NOTES
"Patient states \"I'm in so much pain but if you won't get me regular, solid food I won't take any medication\". Patient offered food that is within her diet order. Patient continues to refuse and states \"Fine, I guess I'll just lay here in pain\".  "

## 2019-01-02 NOTE — DISCHARGE INSTRUCTIONS
Discharge Instructions    Discharged to home by car with relative. Discharged via wheelchair, hospital escort: Yes.  Special equipment needed: Not Applicable    Be sure to schedule a follow-up appointment with your primary care doctor or any specialists as instructed.     Discharge Plan:   Influenza Vaccine Indication: Patient Refuses    I understand that a diet low in cholesterol, fat, and sodium is recommended for good health. Unless I have been given specific instructions below for another diet, I accept this instruction as my diet prescription.   Other diet: Regualr    Special Instructions: None    · Is patient discharged on Warfarin / Coumadin?   No       Rib Fracture  A rib fracture is a break or crack in one of the bones of the ribs. The ribs are like a cage that goes around your upper chest. A broken or cracked rib is often painful, but most do not cause other problems. Most rib fractures heal on their own in 1-3 months.  Follow these instructions at home:  · Avoid activities that cause pain to the injured area. Protect your injured area.  · Slowly increase activity as told by your doctor.  · Take medicine as told by your doctor.  · Put ice on the injured area for the first 1-2 days after you have been treated or as told by your doctor.  ¨ Put ice in a plastic bag.  ¨ Place a towel between your skin and the bag.  ¨ Leave the ice on for 15-20 minutes at a time, every 2 hours while you are awake.  · Do deep breathing as told by your doctor. You may be told to:  ¨ Take deep breaths many times a day.  ¨ Cough many times a day while hugging a pillow.  ¨ Use a device (incentive spirometer) to perform deep breathing many times a day.  · Drink enough fluids to keep your pee (urine) clear or pale yellow.  · Do not wear a rib belt or binder. These do not allow you to breathe deeply.  Get help right away if:  · You have a fever.  · You have trouble breathing.  · You cannot stop coughing.  · You cough up thick or bloody  spit (mucus).  · You feel sick to your stomach (nauseous), throw up (vomit), or have belly (abdominal) pain.  · Your pain gets worse and medicine does not help.  This information is not intended to replace advice given to you by your health care provider. Make sure you discuss any questions you have with your health care provider.  Document Released: 09/26/2009 Document Revised: 05/25/2017 Document Reviewed: 02/19/2014  [employer] Interactive Patient Education © 2017 [employer]    Depression / Suicide Risk    As you are discharged from this [facility] facility, it is important to learn how to keep safe from harming yourself.    Recognize the warning signs:  · Abrupt changes in personality, positive or negative- including increase in energy   · Giving away possessions  · Change in eating patterns- significant weight changes-  positive or negative  · Change in sleeping patterns- unable to sleep or sleeping all the time   · Unwillingness or inability to communicate  · Depression  · Unusual sadness, discouragement and loneliness  · Talk of wanting to die  · Neglect of personal appearance   · Rebelliousness- reckless behavior  · Withdrawal from people/activities they love  · Confusion- inability to concentrate     If you or a loved one observes any of these behaviors or has concerns about self-harm, here's what you can do:  · Talk about it- your feelings and reasons for harming yourself  · Remove any means that you might use to hurt yourself (examples: pills, rope, extension cords, firearm)  · Get professional help from the community (Mental Health, Substance Abuse, psychological counseling)  · Do not be alone:Call your Safe Contact- someone whom you trust who will be there for you.  · Call your local CRISIS HOTLINE 828-4012 or 373-954-4407  · Call your local Children's Mobile Crisis Response Team [place] (326) 042-3478 or www.[employer]  · Call the toll free National Suicide Prevention Hotlines    · National Suicide Prevention Lifeline 569-342-YTNR (1573)  · [facility] 800-SUICIDE (929-8491)      Discharge instructions:     1. DIET: Upon discharge from the hospital you may resume your normal preoperative diet.     2. ACTIVITIES: After discharge from the hospital, you may resume full routine activities. However, there should be no heavy lifting (greater than 15 pounds) and no strenuous activities until after your follow-up visit. Otherwise, routine activities of daily living such as walking and going up and down stairs are acceptable.     3. DRIVING: You may drive whenever you are off pain medications and are able to perform the activities needed to drive, i.e. turning, bending, twisting, etc.     4. WOUND/BATHING: You may get the wound wet at any time after leaving the hospital. You may shower, but do not submerge in a bath for at least a week.     5. BOWEL FUNCTION: Constipation is common after an operation, especially with pain medications. The combination of pain medication and decreased activity level can cause constipation in otherwise normal patients. If you feel this is occurring, take a laxative (Milk of Magnesia, Ex-Lax, Senokot, Miralax, Magnesium Citrate) until the problem has resolved.     6. PAIN MEDICATION: You will be given a prescription for pain medication at discharge. Please take these as directed. It is important to remember not to take medications on an empty stomach as this may cause nausea.     7.CALL IF YOU HAVE: (1) Fevers to more than 101F, (2) Unusual chest or leg pain, (3) Drainage or fluid from incision that may be foul smelling, increased tenderness or soreness at the wound or the wound edges are no longer together, redness or swelling at the incision site. Please do not hesitate to call with any other questions.     8. APPOINTMENT: Contact our office at [phone] for a follow-up appointment in 1-2 weeks following your procedure.     If you have any  additional questions, please do not hesitate to call the office and speak to either myself or the physician on call.     Office address:    [facility], [address] [place] [address]     Kirk Juarez MD   [facility]   [phone]
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
## 2019-01-20 LAB — LV EJECT FRACT  99904: 65
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
## (undated) DEVICE — CANISTER SUCTION 3000ML MECHANICAL FILTER AUTO SHUTOFF MEDI-VAC NONSTERILE LF DISP  (40EA/CA)

## (undated) DEVICE — GLOVE BIOGEL SZ 7.5 SURGICAL PF LTX - (50PR/BX 4BX/CA)

## (undated) DEVICE — BLOCK

## (undated) DEVICE — LIGASURE TISSUE FUSION  - SINGLE USE (6/CA)

## (undated) DEVICE — ELECTRODE DUAL RETURN W/ CORD - (50/PK)

## (undated) DEVICE — SENSOR SPO2 NEO LNCS ADHESIVE (20/BX) SEE USER NOTES

## (undated) DEVICE — SUCTION INSTRUMENT YANKAUER BULBOUS TIP W/O VENT (50EA/CA)

## (undated) DEVICE — BLADE SURGICAL #15 - (50/BX 3BX/CA)

## (undated) DEVICE — GLOVE, BIOGEL ECLIPSE, SZ 7.0, PF LTX (50/BX)

## (undated) DEVICE — HEAD HOLDER JUNIOR/ADULT

## (undated) DEVICE — DETERGENT RENUZYME PLUS 10 OZ PACKET (50/BX)

## (undated) DEVICE — SUTURE 3-0 VICRYL PLUS SH - 27 INCH (36/BX)

## (undated) DEVICE — SPONGE GAUZESTER 4 X 4 4PLY - (128PK/CA)

## (undated) DEVICE — SET LEADWIRE 5 LEAD BEDSIDE DISPOSABLE ECG (1SET OF 5/EA)

## (undated) DEVICE — PROTECTOR ULNA NERVE - (36PR/CA)

## (undated) DEVICE — KIT ANESTHESIA W/CIRCUIT & 3/LT BAG W/FILTER (20EA/CA)

## (undated) DEVICE — CHLORAPREP 26 ML APPLICATOR - ORANGE TINT(25/CA)

## (undated) DEVICE — SPONGE GAUZE STER 4X4 8-PL - (2/PK 50PK/BX 12BX/CS)

## (undated) DEVICE — PAD LAP STERILE 18 X 18 - (5/PK 40PK/CA)

## (undated) DEVICE — BLADE SURGICAL CLIPPER - (50EA/CA)

## (undated) DEVICE — BOVIE  BLADE 6 EXTENDED - (50/PK)

## (undated) DEVICE — GLOVE BIOGEL INDICATOR SZ 7SURGICAL PF LTX - (50/BX 4BX/CA)

## (undated) DEVICE — LACTATED RINGERS INJ 1000 ML - (14EA/CA 60CA/PF)

## (undated) DEVICE — SODIUM CHL IRRIGATION 0.9% 1000ML (12EA/CA)

## (undated) DEVICE — STAPLER 60MM BLUE 3.5MM WITH - STAPLE (3EA/BX)

## (undated) DEVICE — SUTURE GENERAL

## (undated) DEVICE — ELECTRODE 850 FOAM ADHESIVE - HYDROGEL RADIOTRNSPRNT (50/PK)

## (undated) DEVICE — MASK ANESTHESIA ADULT  - (100/CA)

## (undated) DEVICE — STAPLER SKIN DISP - (6/BX 10BX/CA) VISISTAT

## (undated) DEVICE — SYRINGE 10 ML CONTROL LL (25EA/BX 4BX/CA)

## (undated) DEVICE — PACK MAJOR BASIN - (2EA/CA)

## (undated) DEVICE — DRAPE MEDI-SLUSH STERILE  - (40/CA)

## (undated) DEVICE — TUBE CONNECT SUCTION CLEAR 120 X 1/4" (50EA/CA)"

## (undated) DEVICE — STAPLE 75MM LINEAR (12EA/BX)

## (undated) DEVICE — TUBING CLEARLINK DUO-VENT - C-FLO (48EA/CA)

## (undated) DEVICE — NEPTUNE 4 PORT MANIFOLD - (20/PK)

## (undated) DEVICE — SUTURE 0 COATED VICRYL 6-18IN - (12PK/BX)

## (undated) DEVICE — DRAPE LAPAROTOMY T SHEET - (12EA/CA)

## (undated) DEVICE — SUTURE 5-0 VICRYL PLUS P-3 18 (36PK/BX)"

## (undated) DEVICE — TOWELS CLOTH SURGICAL - (4/PK 20PK/CA)

## (undated) DEVICE — SUTURE 1 PDS-2 PLUS CTX - (24/BX)

## (undated) DEVICE — SUTURE 6-0 ETHILON P-1 18 (12PK/BX)"

## (undated) DEVICE — SUTURE 3-0 VICRYL PLUS SH - 8X 18 INCH (12/BX)

## (undated) DEVICE — DRAPE MAYO STAND - (30/CA)

## (undated) DEVICE — KIT ROOM DECONTAMINATION

## (undated) DEVICE — GLOVE BIOGEL INDICATOR SZ 7.5 SURGICAL PF LTX - (50PR/BX 4BX/CA)

## (undated) DEVICE — GOWN SURGEONS X-LARGE - DISP. (30/CA)

## (undated) DEVICE — GOWN WARMING STANDARD FLEX - (30/CA)

## (undated) DEVICE — GLOVE BIOGEL SZ 7 SURGICAL PF LTX - (50PR/BX 4BX/CA)

## (undated) DEVICE — SET EXTENSION WITH 2 PORTS (48EA/CA) ***PART #2C8610 IS A SUBSTITUTE*****